# Patient Record
Sex: FEMALE | Race: WHITE | URBAN - METROPOLITAN AREA
[De-identification: names, ages, dates, MRNs, and addresses within clinical notes are randomized per-mention and may not be internally consistent; named-entity substitution may affect disease eponyms.]

---

## 2023-06-27 ENCOUNTER — OUT OF OFFICE VISIT (OUTPATIENT)
Dept: URBAN - METROPOLITAN AREA MEDICAL CENTER 28 | Facility: MEDICAL CENTER | Age: 38
End: 2023-06-27
Payer: COMMERCIAL

## 2023-06-27 DIAGNOSIS — K70.31 ALCOHOLIC CIRRHOSIS OF LIVER WITH ASCITES: ICD-10-CM

## 2023-06-27 DIAGNOSIS — R79.89 ABNORMAL BILIRUBIN TEST: ICD-10-CM

## 2023-06-27 DIAGNOSIS — R74.8 ABNORMAL ALKALINE PHOSPHATASE TEST: ICD-10-CM

## 2023-06-27 DIAGNOSIS — K76.6 CLINICALLY SIGNIFICANT PORTAL HYPERTENSION: ICD-10-CM

## 2023-06-27 PROCEDURE — G8427 DOCREV CUR MEDS BY ELIG CLIN: HCPCS | Performed by: INTERNAL MEDICINE

## 2023-06-27 PROCEDURE — 99222 1ST HOSP IP/OBS MODERATE 55: CPT | Performed by: INTERNAL MEDICINE

## 2023-06-27 PROCEDURE — 99232 SBSQ HOSP IP/OBS MODERATE 35: CPT | Performed by: INTERNAL MEDICINE

## 2023-06-28 ENCOUNTER — OUT OF OFFICE VISIT (OUTPATIENT)
Dept: URBAN - METROPOLITAN AREA MEDICAL CENTER 28 | Facility: MEDICAL CENTER | Age: 38
End: 2023-06-28
Payer: COMMERCIAL

## 2023-06-28 DIAGNOSIS — K74.69 CIRRHOSIS, CRYPTOGENIC: ICD-10-CM

## 2023-06-28 PROCEDURE — 43235 EGD DIAGNOSTIC BRUSH WASH: CPT | Performed by: INTERNAL MEDICINE

## 2023-07-14 ENCOUNTER — LAB OUTSIDE AN ENCOUNTER (OUTPATIENT)
Dept: URBAN - METROPOLITAN AREA CLINIC 19 | Facility: CLINIC | Age: 38
End: 2023-07-14

## 2023-07-14 ENCOUNTER — WEB ENCOUNTER (OUTPATIENT)
Dept: URBAN - METROPOLITAN AREA CLINIC 19 | Facility: CLINIC | Age: 38
End: 2023-07-14

## 2023-07-14 ENCOUNTER — OFFICE VISIT (OUTPATIENT)
Dept: URBAN - METROPOLITAN AREA CLINIC 19 | Facility: CLINIC | Age: 38
End: 2023-07-14
Payer: COMMERCIAL

## 2023-07-14 VITALS
BODY MASS INDEX: 34.45 KG/M2 | WEIGHT: 194.4 LBS | DIASTOLIC BLOOD PRESSURE: 78 MMHG | TEMPERATURE: 98 F | SYSTOLIC BLOOD PRESSURE: 132 MMHG | HEIGHT: 63 IN | HEART RATE: 89 BPM

## 2023-07-14 DIAGNOSIS — K70.31 ALCOHOLIC CIRRHOSIS OF LIVER WITH ASCITES: ICD-10-CM

## 2023-07-14 PROBLEM — 420054005: Status: ACTIVE | Noted: 2023-07-14

## 2023-07-14 PROCEDURE — 99215 OFFICE O/P EST HI 40 MIN: CPT | Performed by: NURSE PRACTITIONER

## 2023-07-14 RX ORDER — FOLIC ACID 1 MG/1
1 TABLET TABLET ORAL ONCE A DAY
Status: ON HOLD | COMMUNITY

## 2023-07-14 RX ORDER — SPIRONOLACTONE 50 MG/1
1 TABLET TABLET, FILM COATED ORAL ONCE A DAY
Status: ACTIVE | COMMUNITY

## 2023-07-14 RX ORDER — CITALOPRAM 20 MG/1
1 TABLET TABLET, FILM COATED ORAL ONCE A DAY
Status: ACTIVE | COMMUNITY

## 2023-07-14 RX ORDER — FUROSEMIDE 20 MG/1
1 TABLET TABLET ORAL ONCE A DAY
Status: ACTIVE | COMMUNITY

## 2023-07-14 RX ORDER — LACTULOSE 10 G/15ML
15 ML AS NEEDED SOLUTION ORAL ONCE A DAY
Status: ACTIVE | COMMUNITY

## 2023-07-14 NOTE — HPI-ZZZTODAY'S VISIT
38-year-old female presents to the office for urgent office visit/hospital follow-up.  She was admitted to the hospital 6/27/2023 for worsening abdominal distention and lower extremity edema.  She has a history of EtOH abuse.  Father passed away in March and has been drinking heavily.  Admission labs hemoglobin 12.2, platelets 102, INR 1.6, bilirubin 4.3, alk phos 203, AST 69, ALT 20, lipase 97, ethanol level 222.  Hepatitis panel negative MELD score 17 CT abdomen pelvis shows cirrhotic nodular liver as well as portal hypertension, diffuse abdominal and pelvic ascites. EGD 6/28/2023 normal Paracentesis 6/27   11 L of fluid removed, paracentesis 6/29   7.5 L removed Started on Lasix 20 mg and Aldactone 50 mg, lactulose 10 G/15 mL for constipation  She has abdominal swelling, jaundice and nausea. No pedal edema.  Last drink was June 25th. She is not following any special diet. She has lost 11lbs since leaving hospital.  PLAN labs, paracentesis prn with albumin replacement, low NA diet, consider referral to Liver tranplant team, adjust meds up if labs are ok

## 2023-07-18 ENCOUNTER — LAB OUTSIDE AN ENCOUNTER (OUTPATIENT)
Dept: URBAN - METROPOLITAN AREA CLINIC 19 | Facility: CLINIC | Age: 38
End: 2023-07-18

## 2023-07-18 ENCOUNTER — TELEPHONE ENCOUNTER (OUTPATIENT)
Dept: URBAN - METROPOLITAN AREA CLINIC 19 | Facility: CLINIC | Age: 38
End: 2023-07-18

## 2023-07-18 LAB
A/G RATIO: 0.9
ABSOLUTE BASOPHILS: 29
ABSOLUTE EOSINOPHILS: 130
ABSOLUTE LYMPHOCYTES: 1205
ABSOLUTE MONOCYTES: 218
ABSOLUTE NEUTROPHILS: 2617
AFP, SERUM, TUMOR MARKER: 4.6
ALBUMIN: 3.4
ALKALINE PHOSPHATASE: 158
ALT (SGPT): 14
AST (SGOT): 36
BASOPHILS: 0.7
BILIRUBIN, TOTAL: 7.3
BUN/CREATININE RATIO: (no result)
BUN: 12
CALCIUM: 8.8
CARBON DIOXIDE, TOTAL: 21
CHLORIDE: 103
COMMENT(S): (no result)
CREATININE: 0.5
EGFR: 122
EOSINOPHILS: 3.1
GLOBULIN, TOTAL: 3.9
GLUCOSE: 115
HEMATOCRIT: 38
HEMOGLOBIN: 13.3
INR: 1.4
LYMPHOCYTES: 28.7
MCH: 35.8
MCHC: 35
MCV: 102.4
MONOCYTES: 5.2
MPV: 11.9
NEUTROPHILS: 62.3
PLATELET COUNT: 94
POTASSIUM: 4.7
PROTEIN, TOTAL: 7.3
PT: 14.4
RDW: 12.1
RED BLOOD CELL COUNT: 3.71
SODIUM: 136
WHITE BLOOD CELL COUNT: 4.2

## 2023-07-18 RX ORDER — FUROSEMIDE 40 MG/1
1 TABLET TABLET ORAL ONCE A DAY
Qty: 30 | Refills: 1 | OUTPATIENT
Start: 2023-07-18

## 2023-07-18 RX ORDER — SPIRONOLACTONE 100 MG/1
1 TABLET TABLET, FILM COATED ORAL ONCE A DAY
Qty: 30 | Refills: 1 | OUTPATIENT
Start: 2023-07-18 | End: 2023-09-16

## 2023-07-18 RX ORDER — FOLIC ACID 1 MG/1
1 TABLET TABLET ORAL ONCE A DAY
Status: ON HOLD | COMMUNITY

## 2023-07-18 RX ORDER — LACTULOSE 10 G/15ML
15 ML AS NEEDED SOLUTION ORAL ONCE A DAY
Status: ACTIVE | COMMUNITY

## 2023-07-18 RX ORDER — FUROSEMIDE 20 MG/1
1 TABLET TABLET ORAL ONCE A DAY
Status: ACTIVE | COMMUNITY

## 2023-07-18 RX ORDER — CITALOPRAM 20 MG/1
1 TABLET TABLET, FILM COATED ORAL ONCE A DAY
Status: ACTIVE | COMMUNITY

## 2023-07-18 RX ORDER — SPIRONOLACTONE 50 MG/1
1 TABLET TABLET, FILM COATED ORAL ONCE A DAY
Status: ACTIVE | COMMUNITY

## 2023-07-24 ENCOUNTER — TELEPHONE ENCOUNTER (OUTPATIENT)
Dept: URBAN - METROPOLITAN AREA CLINIC 86 | Facility: CLINIC | Age: 38
End: 2023-07-24

## 2023-07-24 PROBLEM — 443913008: Status: ACTIVE | Noted: 2023-07-24

## 2023-07-24 NOTE — HPI-TODAY'S VISIT:
Patient is a 38-year-old female coming in to see us at the request of Barb Espinosa nurse practitioner and Dr. Antoine Gomez for evaluation of recently noted cirrhosis suspected due to alcohol.  A copy of the note will be sent to the referring provider.  Patient was last seen by them on July 14, 2023 for a posthospital visit. She was apparently admitted on June 27, 2023 to a local facility for worsening abdominal distention and edema. She had a history of alcohol usage. Her father apparently passed away in March and she had been drinking heavily until then. Admission labs reported a hemoglobin of 12.2 platelet count 102 INR 1.6 bilirubin 4.3 alk phos 203 AST 69 ALT 20 lipase 97 ethanol level 222.  Hepatitis ABC panel was reported negative. Patient had a MELD score reported to be 17 but do not have a DF score. CT of the abdomen showed cirrhotic nodular liver as well as portal pretension and diffuse abdominal pelvic ascites. June 28, 2023 EGD was reported normal. June 27,011 and 11 L paracentesis was done on June 29 with 7.5 L paracentesis was removed. Patient was started on Lasix 20 and Aldactone 50 mg a day and given lactulose mostly for constipation per their note for encephalopathy issues. Last alcohol was on June 25. When seen on July 14 she was not on any special diet but had lost about 11 pounds since leaving the hospital. The plan was to give her paracentesis as needed with albumin, watch her labs and reassess. Weight was noted to be 194.4 pounds at the time with a BMI of 34.43. Abdomen is reported to be distended then. Local labs from July 14 showed AFP of 4.6.  With a sugar of 115 BUN of 12 creatinine 0.5 sodium 136 potassium 4.7 chloride 103 CO2 of 21 calcium 8.8 albumin diminished at 3.4 bilirubin elevated 7.3 alkaline phosphatase 158 AST of 36 and ALT of 14.  WBC 4.2 hemoglobin 13.3 plate count 94 .4 with neutrophils 2617 and lymphocytes 1205.  Prothrombin time was listed as 14.4 with normal up to 11.5 seconds there.  INR was 1.4. Df 20.6 Meld 18/19.  Able to review some of the [facility] records directly and saw that on June 28, 2023 Dr. Perkins did the EGD and it was normal with the Z-line at 37 cm and the stomach and duodenum also appeared normal. The June 27, 2023 admit consult mentions how patient had been complaining about edema for a week and had been using alcohol at least a bottle of wine per day for several years and then had cut back to 3 to 4 glasses a day but then when her father passed in March she was drinking more heavily and came in with the hemoglobin of 12.2 INR 1.6 platelets 102 bilirubin 4.3 alk phos 283 AST 69 ALT 20 lipase 97 and ethanol of 222. Mention how the CT again has shown the diffuse abdominal pelvic ascites as well as sequela of portal pretension and the cirrhosis. She never had a colonoscopy.  Previous EGD had been 20 years ago. Other past medical history listed ADHD, alcohol use, anxiety, diabetes, ascites.  Jaundice. Alcohol usage again was as listed above.  Denied tobacco usage.  CT scan had shown lower thorax with no effusion of the pleural or pericardial area but did a cirrhotic nodular liver with sequela of hypertension including gastroesophageal and splenic varices and moderate to large intra-abdominal ascites.  The fluid appeared simple.  Gallbladder was unremarkable.  No bile duct dilation seen.  Spleen was enlarged.  Splenic mass.  Perisplenic ascites were seen.  No significant bowel wall thickening or evidence of free air was seen.  Atherosclerotic calcifications were seen in the abdominal aorta and branches but without aneurysm.  They felt her MELD score was 17 at the time and they plan for the EGD to be done. July 18, 2023 ultrasound was 7.1 L. June 29, 2020 ultrasound 7.5 L. June 27, 2000 2311 L. Discharge summary mentions she was admitted from the 27th of the 29th and that she was scheduled to follow-up with Dr. Cruz. They had recommended her to be on thiamine and folic acid.  She was kept on her Lasix and Aldactone after her paracentesis treatments.  She was monitored for withdrawal and they were not noted.  She was kept on her citalopram and Adderall for her anxiety/ADHD/depression. She was reported to have type 2 diabetes but with an A1c of 4.4 and been off of medicines for 2 years. Discharge medicines include citalopram 1 and half tablets at night, folic acid 100-day, Lasix 20 mils a day, lactulose 15 mL once a day as needed, Aldactone 50 mg a day and thiamine 100 mg a day. 1.	Alcoholic hepatitis with ascites with the patient having heavy alcohol through the admission in June 2023 with a blood alcohol level of 222.  Patient noted to have a MELD less than 21 and DF score less than 32.  Patient was treated with paracentesis x3, started on diuretics and apparently doing better.  He is to be on low-salt diet.  Was reported to have lost 11 pounds as of July 14 visit.  Now coming in to see us here for opinion.  Stressed importance of continued alcohol abstinence as the best chance for long-term improvement as well as also for transplant as needed and to be able to be considered for same as well.  Recommended counseling. 2.	Alcoholic cirrhosis underlying with varices seen on CT but not by EGD.  Would like MRI next time to get another look at his MRI sometimes can see that better as well and we will plan for that to be done possibly in the September timeframe once the ascites is doing better as needed ascites to be better to get that looked.  MELD score again less than 21 and DF score less than 32.  Patient needs to be on thiamine and folic acid and have intermittent tumor screening as well as occasional variceal screening given this history.  Varices again seen by CT but not by EGD. 3.	Alcohol use disorder in remission.  Recently and has been apparently consuming heavier before had been on a much lower amount and then increased again with death of family member.  Needs counseling options to be explored.  Explained that absolute abstinence has been associated with the best outcomes. 4.	High risk medication usage noted on diuretics and follow on same. 5.	Diabetes history noted but not on medications. 6.	ADHD history noted on medications. 7.	Anxiety and depression history noted and follows as per team. 8.	Irritable bowel syndrome reported history. 9.	Vaccine status needs check for hep A/B to confirm immunity to same. Plan 1.  _update labs to see how patient continues to do. 2.  Encourage patient remain off of alcohol and to seek out counseling for best chance for long-term remission and also in case she does not improve further to be a candidate for transplant if needed. 3.  Consider advanced MRI for next exam to look at liver further and to avoid the CT contrast and get a better look at the liver. 4.  Continue her thiamine and folic acid. 5.  Stressed that absolute abstinence is the best chance for long-term to be better.  Duration of the visit was minutes with 10 minutes of chart prep and 20 minutes of face to face/TeleMed visit reviewing recent records, discussing their current status and the future plans for the patient.

## 2023-07-31 ENCOUNTER — OFFICE VISIT (OUTPATIENT)
Dept: URBAN - METROPOLITAN AREA CLINIC 86 | Facility: CLINIC | Age: 38
End: 2023-07-31
Payer: COMMERCIAL

## 2023-07-31 VITALS
SYSTOLIC BLOOD PRESSURE: 110 MMHG | HEART RATE: 84 BPM | WEIGHT: 175 LBS | DIASTOLIC BLOOD PRESSURE: 71 MMHG | HEIGHT: 63 IN | TEMPERATURE: 97.1 F | BODY MASS INDEX: 31.01 KG/M2

## 2023-07-31 DIAGNOSIS — Z79.899 HIGH RISK MEDICATION USE: ICD-10-CM

## 2023-07-31 DIAGNOSIS — K70.31 ALCOHOLIC CIRRHOSIS OF LIVER WITH ASCITES: ICD-10-CM

## 2023-07-31 DIAGNOSIS — E11.9 DIABETES: ICD-10-CM

## 2023-07-31 DIAGNOSIS — K58.8 OTHER IRRITABLE BOWEL SYNDROME: ICD-10-CM

## 2023-07-31 PROCEDURE — 99245 OFF/OP CONSLTJ NEW/EST HI 55: CPT

## 2023-07-31 PROCEDURE — 99215 OFFICE O/P EST HI 40 MIN: CPT

## 2023-07-31 RX ORDER — FUROSEMIDE 40 MG/1
1 TABLET TABLET ORAL ONCE A DAY
Qty: 30 | Refills: 1 | Status: ACTIVE | COMMUNITY
Start: 2023-07-18

## 2023-07-31 RX ORDER — SPIRONOLACTONE 50 MG/1
1 TABLET TABLET, FILM COATED ORAL ONCE A DAY
Status: DISCONTINUED | COMMUNITY

## 2023-07-31 RX ORDER — LACTULOSE 10 G/15ML
15 ML AS NEEDED SOLUTION ORAL ONCE A DAY
Qty: 450 ML | Refills: 1

## 2023-07-31 RX ORDER — CITALOPRAM 20 MG/1
1 TABLET TABLET, FILM COATED ORAL ONCE A DAY
Status: ACTIVE | COMMUNITY

## 2023-07-31 RX ORDER — LACTULOSE 10 G/15ML
15 ML AS NEEDED SOLUTION ORAL ONCE A DAY
Status: ACTIVE | COMMUNITY

## 2023-07-31 RX ORDER — SPIRONOLACTONE 100 MG/1
1 TABLET TABLET, FILM COATED ORAL ONCE A DAY
Qty: 30 | Refills: 1 | Status: ACTIVE | COMMUNITY
Start: 2023-07-18 | End: 2023-09-16

## 2023-07-31 RX ORDER — THIAMINE HCL 100 MG
1 TABLET TABLET ORAL ONCE A DAY
Status: ACTIVE | COMMUNITY

## 2023-07-31 RX ORDER — FUROSEMIDE 20 MG/1
1 TABLET TABLET ORAL ONCE A DAY
Status: DISCONTINUED | COMMUNITY

## 2023-07-31 RX ORDER — FOLIC ACID 1 MG/1
1 TABLET TABLET ORAL ONCE A DAY
Status: ACTIVE | COMMUNITY

## 2023-07-31 NOTE — HPI-TODAY'S VISIT:
Patient is a 38-year-old female coming in to see us at the request of Barb Espinosa nurse practitioner and Dr. Antoine Gomez for evaluation of recently noted cirrhosis suspected due to alcohol.    A copy of the note will be sent to the referring provider.  Patient was last seen by them on July 14, 2023 for a posthospital visit.  She was apparently admitted on June 27, 2023 to a local facility for worsening abdominal distention and edema.  She had a history of alcohol usage. Pt at the time was having several glasses at night for years.   Dr Wills is for women having more 2 drinks a day will lead to cirrhosis over time.   Her father apparently passed away in March and she had been drinking heavily until then.  Alcohol stopped since June 25/26 and has not done any counseling.  Admission labs reported a hemoglobin of 12.2 platelet count 102 INR 1.6 bilirubin 4.3 alk phos 203 AST 69 ALT 20 lipase 97 ethanol level 222.  Hepatitis ABC panel was reported negative.  Patient had a MELD score reported to be 17 but do not have a DF score. Severe is  alc hep meld over 21.  But above the 15 cut off for oltx.   CT of the abdomen showed cirrhotic nodular liver as well as portal pretension and diffuse abdominal pelvic ascites.  June 28, 2023 EGD was reported normal.  June 27,011 and 11 L paracentesis was done on June 29 with 7.5 L paracentesis was removed.   July 18 7.5 L and 4th one this friday.  She is learing to be on low salt diet.  She had a tap tomorrow will be 2 weeks post the last one.  Patient was started on Lasix 20 and Aldactone 50 mg a day and was inc since to 40 and 100mg   Weight is down 75 from admit and part fluid and part not.  Still losing some.  The ascites may be better with the dose inc.  She was given lactulose mostly for constipation per their note and not for encephalopathy issues.  When seen on July 14 she was not on any special diet but had lost about 11 pounds more amd was 251 admit since leaving the hospital.  Weight was noted to be 194.4 pounds at the time with a BMI of 34.43.  Abdomen is reported to be distended then.  Local labs from July 14 showed AFP of 4.6.  With a sugar of 115 BUN of 12 creatinine 0.5 sodium 136 potassium 4.7 chloride 103 CO2 of 21 calcium 8.8 albumin diminished at 3.4 bilirubin elevated 7.3 alkaline phosphatase 158 AST of 36 and ALT of 14.  WBC 4.2 hemoglobin 13.3 plate count 94 .4 with neutrophils 2617 and lymphocytes 1205.  Prothrombin time was listed as 14.4 with normal up to 11.5 seconds there.  INR was 1.4.  Df 20.6  Meld 18/19.  Able to review some of the [facility] records directly and saw that on June 28, 2023 Dr. Perkins did the EGD and it was normal with the Z-line at 37 cm and the stomach and duodenum also appeared normal.  The June 27, 2023 admit consult mentions how patient had been complaining about edema for a week and had been using alcohol at least a bottle of wine per day for several years and then had cut back to 3 to 4 glasses a day but then when her father passed in March she was drinking more heavily and came in with the hemoglobin of 12.2 INR 1.6 platelets 102 bilirubin 4.3 alk phos 283 AST 69 ALT 20 lipase 97 and ethanol of 222.  Mention how the CT again has shown the diffuse abdominal pelvic ascites as well as sequela of portal pretension and the cirrhosis.  She never had a colonoscopy.  Previous EGD had been 20 years ago.  Other past medical history listed ADHD, alcohol use, anxiety, diabetes, ascites.  Jaundice.  A1c 4.4 % without meds.  Alcohol usage again was as listed above.  Denied tobacco usage.    CT scan had shown lower thorax with no effusion of the pleural or pericardial area but did a cirrhotic nodular liver with sequela of hypertension including gastroesophageal and splenic varices and moderate to large intra-abdominal ascites.  The fluid appeared simple.  Gallbladder was unremarkable.  No bile duct dilation seen.  Spleen was enlarged.  Splenic mass.  Perisplenic ascites were seen.  No significant bowel wall thickening or evidence of free air was seen.  Atherosclerotic calcifications were seen in the abdominal aorta and branches but without aneurysm.  They felt her MELD score was 17 at the time and they plan for the EGD to be done. July 18, 2023 ultrasound was 7.1 L. June 29, 2020 ultrasound 7.5 L. June 27, 2000 2311 L.  Discharge summary mentions she was admitted from the 27th of the 29th and that she was scheduled to follow-up with Dr. Cruz.  She was a 3m wait so was set up to see u.s.  They had recommended her to be on thiamine and folic acid.    She was kept on her Lasix and Aldactone after her paracentesis treatments.  She was monitored for withdrawal and they were not noted.  She was kept on her citalopram and Adderall for her anxiety/ADHD/depression.  She was reported to have type 2 diabetes but with an A1c of 4.4 and been off of medicines for 2 years.  Discharge medicines include citalopram 1 and half tablets at night, folic acid 100-day, Lasix 20 mgsa day, lactulose 15 mL once a day as needed, Aldactone 50 mg a day and thiamine 100 mg a day.   Plan 1.  Need to update labs to see how patient continues to do and to see if meld and df are better, 2.  Encourage patient remain off of alcohol and to seek out counseling for best chance for long-term remission and also in case she does not improve further to be a candidate for transplant if needed. 3.  Consider advanced MRI for next exam to look at liver further and to avoid the CT contrast and get a better look at the liver. 4.  Continue her thiamine and folic acid. 5.  Stressed that absolute abstinence is the best chance for long-term to be better.  Duration of the visit was 80 minutes with 40 minutes of chart prep loadingionfo tochart for visit and 40 minutes of face to face visit reviewing recent records, discussing their current status and the future plans for the patient.

## 2023-07-31 NOTE — EXAM-PHYSICAL EXAM
Gen: awake and responsive. Eyes: min icteric, normal lids. Mouth: normal lips. Nose: no drainage. Hearing: intact grossly. Neck: trachea midline and no jvd. CV: RRR no s3. Lungs: clear. No wheezes, Abd: Soft, nabs, nr, at the most some mild ascites , NT. No appreciable liver or spleen enlargement. Small umb hernia and reduces and not tender and ventral hernia from muscle loss and recent ascites.  Ext: no sig edema, some palm erythema. Neuro: moves all 4 ext grossly. No asterixis. Skin: no pruritis and some palm erythema.

## 2023-08-04 ENCOUNTER — TELEPHONE ENCOUNTER (OUTPATIENT)
Dept: URBAN - METROPOLITAN AREA CLINIC 19 | Facility: CLINIC | Age: 38
End: 2023-08-04

## 2023-08-04 ENCOUNTER — LAB OUTSIDE AN ENCOUNTER (OUTPATIENT)
Dept: URBAN - METROPOLITAN AREA CLINIC 19 | Facility: CLINIC | Age: 38
End: 2023-08-04

## 2023-08-04 LAB
ABSOLUTE BASOPHIL COUNT: 0.03
ABSOLUTE EOSINOPHIL COUNT: 0.06
ABSOLUTE IMMATURE GRANULOCYTE  COUNT: 0.01
ABSOLUTE LYMPHOCYTE COUNT: 1.09
ABSOLUTE MONOCYTE COUNT: 0.25
ABSOLUTE NEUTROPHIL COUNT (ANC): 2.74
ABSOLUTE NRBC  COUNT: 0
BASOPHIL AUTO: 1
EOS AUTO: 1
HCT: 35.6
HGB: 12.4
IMMATURE GRANULOCYTES AUTO: 0.2
INR: 1.5
IPF: 4.9
LYMPH AUTO: 26
MCH: 36.6
MCHC: 34.8
MCV: 105
MONO AUTO: 6
MPV: 11.4
NEUTRO AUTO: 66
NRBC AUTO: 0
PARTIAL THROMBOPLASTIN TIME: 36.6
PERFORMING LAB: (no result)
PLATELETS: 67
PLT ESTIMATION: (no result)
PLT LARGE FORMS: PRESENT
PT: 17.4
RBC MORPH: (no result)
RBC: 3.39
RDW: 12.9
SLIDE REVIEW: (no result)
WBC: 4.2

## 2023-08-10 ENCOUNTER — TELEPHONE ENCOUNTER (OUTPATIENT)
Dept: URBAN - METROPOLITAN AREA CLINIC 86 | Facility: CLINIC | Age: 38
End: 2023-08-10

## 2023-08-10 LAB
A/G RATIO: 1.1
ABSOLUTE BASOPHILS: 40
ABSOLUTE EOSINOPHILS: 101
ABSOLUTE LYMPHOCYTES: 1250
ABSOLUTE MONOCYTES: 290
ABSOLUTE NEUTROPHILS: 2719
ALBUMIN: 3.7
ALKALINE PHOSPHATASE: 154
ALPHA-1-ANTITRYPSIN (AAT) PHENOTYPE: (no result)
ALT (SGPT): 18
AST (SGOT): 39
BASOPHILS: 0.9
BILIRUBIN, TOTAL: 6.2
BUN/CREATININE RATIO: (no result)
BUN: 16
CALCIUM: 8.9
CARBON DIOXIDE, TOTAL: 21
CERULOPLASMIN: 32
CHLORIDE: 101
CREATININE: 0.65
EGFR: 114
EOSINOPHILS: 2.3
FERRITIN, SERUM: 440
GLOBULIN, TOTAL: 3.5
GLUCOSE: 132
HEMATOCRIT: 36.2
HEMOGLOBIN: 13.1
HEPATITIS A AB, TOTAL: (no result)
HEPATITIS B SURFACE AB IMMUNITY, QN: 105
INR: 1.3
IRON BIND.CAP.(TIBC): 181
IRON SATURATION: 55
IRON: 100
LYMPHOCYTES: 28.4
MCH: 35.6
MCHC: 36.2
MCV: 98.4
MONOCYTES: 6.6
MPV: 11.1
NEUTROPHILS: 61.8
PLATELET COUNT: 83
POTASSIUM: 4.6
PROTEIN, TOTAL: 7.2
PT: 13.4
RDW: 12.3
RED BLOOD CELL COUNT: 3.68
SODIUM: 135
WHITE BLOOD CELL COUNT: 4.4

## 2023-08-10 NOTE — HPI-TODAY'S VISIT:
Dear Lupe Song, July 31st labs showed your iron sat was elevated at 55%.  We may need to do a hemochromatosis panel to follow-up on that to see why it is running high.  It could still also be high as well because of the alcohol use previously.  Ceruloplasmin was normal at 32. Ferritin was also again up in keeping with iron being up at 440. Hep B immunity was seen at a level of 105. Your alpha-1 screen was normal at MM type. Sugar was up at 132 and higher from July 15 when it was 115. BUN is 16 creatinine 0.65 sodium 135 potassium 4.6 calcium 8.9 albumin 3.7 normal, bilirubin was 6.2 down from 7.3 (not fractionated and need that done next time) and alk phos was 154 from 158 and AST 39 from 36 and ALT of 18 from 14. WBC was 4.2, hemoglobin 12.4 and platelet count low at 67.  MCV was 105. INR was elevated at 1.5 with a prothrombin time 17.4. No hep A immunity was seen.  You should consider getting the hepatitis A vaccine series electively. Summary: DF 17.2 so less than 32. Meld 18 and meld na 19 so less than 21 which is good to see but would want it to be less than 15 and above that. Also, as you recall, you had previously in June when back in the hospital had a MELD score that had been less than 21 and a DF score of less than 32.  You had lost a substantial amount of weight also between your illness and your diuretics. Despite the high sugar your A1c was reported not to be up. Need to follow trends as more time passes.  Treatment is as we discussed supportive and to watch for any issues with your diuretics or labs over time. Continued abstinence is bradshaw. Dr Gee

## 2023-08-23 ENCOUNTER — TELEPHONE ENCOUNTER (OUTPATIENT)
Dept: URBAN - METROPOLITAN AREA CLINIC 86 | Facility: CLINIC | Age: 38
End: 2023-08-23

## 2023-08-31 ENCOUNTER — TELEPHONE ENCOUNTER (OUTPATIENT)
Dept: URBAN - METROPOLITAN AREA CLINIC 86 | Facility: CLINIC | Age: 38
End: 2023-08-31

## 2023-09-04 ENCOUNTER — LAB OUTSIDE AN ENCOUNTER (OUTPATIENT)
Dept: URBAN - METROPOLITAN AREA CLINIC 86 | Facility: CLINIC | Age: 38
End: 2023-09-04

## 2023-09-04 PROBLEM — 389026000: Status: ACTIVE | Noted: 2023-09-04

## 2023-09-21 ENCOUNTER — TELEPHONE ENCOUNTER (OUTPATIENT)
Dept: URBAN - METROPOLITAN AREA CLINIC 86 | Facility: CLINIC | Age: 38
End: 2023-09-21

## 2023-09-21 LAB
A/G RATIO: 1.2
ABSOLUTE BASOPHILS: 20
ABSOLUTE EOSINOPHILS: 101
ABSOLUTE LYMPHOCYTES: 1045
ABSOLUTE MONOCYTES: 191
ABSOLUTE NEUTROPHILS: 2543
ALBUMIN: 3.4
ALKALINE PHOSPHATASE: 113
ALT (SGPT): 16
AST (SGOT): 35
BASOPHILS: 0.5
BILIRUBIN, TOTAL: 9.4
BUN/CREATININE RATIO: (no result)
BUN: 15
CALCIUM: 8.5
CARBON DIOXIDE, TOTAL: 23
CHLORIDE: 101
COMMENT(S): (no result)
CREATININE: 0.67
EGFR: 113
EOSINOPHILS: 2.6
GLOBULIN, TOTAL: 2.9
GLUCOSE: 104
HEMATOCRIT: 35.3
HEMOGLOBIN: 12.7
INR: 1.4
LYMPHOCYTES: 26.8
MCH: 36.6
MCHC: 36
MCV: 101.7
MONOCYTES: 4.9
MPV: 10.4
NEUTROPHILS: 65.2
PLATELET COUNT: 81
POTASSIUM: 4.5
PROTEIN, TOTAL: 6.3
PT: 14.5
RDW: 13.6
RED BLOOD CELL COUNT: 3.47
SODIUM: 135
WHITE BLOOD CELL COUNT: 3.9

## 2023-09-21 NOTE — HPI-TODAY'S VISIT:
Dear Lupe Song, Sept 20 labs show inr little up 1.4 but down from 1.5. Glucose 104 elevated but down from 132. Bun 15 and cr 0.67 and na 135 and k 4.5 and cl 101 and co2 23 and calcium 8.5 and little low and may want to share with primary and look at this and your vitamin d level status. Albumin little lower 3.4 from 3.7 prior. Total bili 9.4 and up from 6.2 and prior 7.3/. Alk 113 and asr 35 and alt 16. Prior ast 9 and alt 18. Wbc 3.9 and hg 12.7 and mcv 101.7 elevated. Platelets low 81.  Rbc little low 3.47 and mch little up 36.6. Neutrophils 2543 and lymphs 1045 normal. Monocytes little low 191. Meld 19 and meld na 20. This went up from 17 and concerning to see.  You have appt coming up 9-27 and we need to discuss this and why labs may be worsening. Need to review options and plans given changes. Dr Gee

## 2023-09-27 ENCOUNTER — OFFICE VISIT (OUTPATIENT)
Dept: URBAN - METROPOLITAN AREA CLINIC 86 | Facility: CLINIC | Age: 38
End: 2023-09-27
Payer: COMMERCIAL

## 2023-09-27 ENCOUNTER — LAB OUTSIDE AN ENCOUNTER (OUTPATIENT)
Dept: URBAN - METROPOLITAN AREA CLINIC 86 | Facility: CLINIC | Age: 38
End: 2023-09-27

## 2023-09-27 ENCOUNTER — TELEPHONE ENCOUNTER (OUTPATIENT)
Dept: URBAN - METROPOLITAN AREA CLINIC 86 | Facility: CLINIC | Age: 38
End: 2023-09-27

## 2023-09-27 VITALS
WEIGHT: 176 LBS | DIASTOLIC BLOOD PRESSURE: 69 MMHG | HEIGHT: 63 IN | BODY MASS INDEX: 31.18 KG/M2 | HEART RATE: 101 BPM | SYSTOLIC BLOOD PRESSURE: 108 MMHG | TEMPERATURE: 97 F

## 2023-09-27 DIAGNOSIS — F10.91 ALCOHOL USE DISORDER IN REMISSION: ICD-10-CM

## 2023-09-27 DIAGNOSIS — K70.31 ALCOHOLIC CIRRHOSIS OF LIVER WITH ASCITES: ICD-10-CM

## 2023-09-27 DIAGNOSIS — K70.11 ALCOHOLIC HEPATITIS WITH ASCITES: ICD-10-CM

## 2023-09-27 DIAGNOSIS — K76.82 HEPATIC ENCEPHALOPATHY: ICD-10-CM

## 2023-09-27 PROCEDURE — 99215 OFFICE O/P EST HI 40 MIN: CPT

## 2023-09-27 RX ORDER — FUROSEMIDE 20 MG/1
1 TABLET TABLET ORAL TWICE A DAY
Qty: 60 TABLET | Refills: 1
Start: 2023-07-18

## 2023-09-27 RX ORDER — FOLIC ACID 1 MG/1
1 TABLET TABLET ORAL ONCE A DAY
Status: ACTIVE | COMMUNITY

## 2023-09-27 RX ORDER — FUROSEMIDE 20 MG/1
1 TABLET TABLET ORAL ONCE A DAY
Qty: 30 | Refills: 1 | Status: ACTIVE | COMMUNITY
Start: 2023-07-18

## 2023-09-27 RX ORDER — LACTULOSE 10 G/15ML
15 ML AS NEEDED SOLUTION ORAL ONCE A DAY
Qty: 450 ML | Refills: 1 | Status: ACTIVE | COMMUNITY

## 2023-09-27 RX ORDER — SPIRONOLACTONE 50 MG/1
1 TABLET TABLET, FILM COATED ORAL ONCE A DAY
Status: ACTIVE | COMMUNITY

## 2023-09-27 RX ORDER — CITALOPRAM 20 MG/1
1 TABLET TABLET, FILM COATED ORAL ONCE A DAY
Status: ACTIVE | COMMUNITY

## 2023-09-27 RX ORDER — AMOXICILLIN AND CLAVULANATE POTASSIUM 500; 125 MG/1; 1/1
1 TABLET TABLET, FILM COATED ORAL
Status: ACTIVE | COMMUNITY

## 2023-09-27 RX ORDER — THIAMINE HCL 100 MG
1 TABLET TABLET ORAL ONCE A DAY
Status: ACTIVE | COMMUNITY

## 2023-09-27 RX ORDER — SPIRONOLACTONE 50 MG/1
1 TABLET TABLET, FILM COATED ORAL TWICE A DAY
Qty: 60 TABLET | Refills: 1

## 2023-09-27 RX ORDER — LACTULOSE 10 G/15ML
15 ML AS NEEDED SOLUTION ORAL ONCE A DAY
Qty: 450 ML | Refills: 1

## 2023-09-27 RX ORDER — RIFAXIMIN 550 MG/1
1 TABLET TABLET ORAL
Qty: 60 | Refills: 3 | OUTPATIENT
Start: 2023-09-27 | End: 2023-10-27

## 2023-09-27 NOTE — EXAM-PHYSICAL EXAM
Gen: awake and responsive. Eyes: more icteric, normal lids. Mouth: normal lips. Nose: no drainage. Hearing: intact grossly. Neck: trachea midline and no jvd. CV: RRR no s3. Lungs: clear. No wheezes, Abd: Soft, nabs, nr, protuberant.mod  ascites , NT.   Small umb hernia.  Ext: some edema, some palm erythema. Neuro: moves all 4 ext grossly.She had overt asterixis. Skin: no pruritis and some palm erythema.

## 2023-09-27 NOTE — HPI-TODAY'S VISIT:
Patient is a 38-year-old female Seen july 2023 at the request of Barb Espinosa nurse practitioner and Dr. Anotine Gomez for evaluation of recently noted cirrhosis suspected due to alcohol.    A copy of the note will be sent to the referring provider.  She had been less jaundiced and she says over the last 5 weeks people commented.  3m off alcohol and send over to the oltx,  Doing therapy with private therapist.  Refer to [place] as 3m and not getting better.  Did ct at [facility] and done for pain post tap and that was new and chills and Dr Tolentino and went in and colitis.  They gave her abx and bentyl.   Sept 20 labs show inr little up 1.4 but down from 1.5. Glucose 104 elevated but down from 132. Bun 15 and cr 0.67 and na 135 and k 4.5 and cl 101 and co2 23 and calcium 8.5 and little low and may want to share with primary and look at this and your vitamin d level status. Albumin little lower 3.4 from 3.7 prior. Total bili 9.4 and up from 6.2 and prior 7.3. Alk 113 and ast 35 and alt 16. Prior ast 9 and alt 18. Wbc 3.9 and hg 12.7 and mcv 101.7 elevated. Platelets low 81.  Rbc little low 3.47 and mch little up 36.6. Neutrophils 2543 and lymphs 1045 normal. Monocytes little low 191. Meld 19 and meld na 20. This went up from 17 and concerning to see.   She did tap friday and got 4.5 liters off and says did tap on side and not drain as much.  re diuretics she was on lasix 20mg and aldactone once day.  Can go up to bid on those and do labs next week and the following,  telemed in 2 weeks and see doing.  July 31st labs showed your iron sat was elevated at 55%.  We may need to do a hemochromatosis panel to follow-up on that to see why it is running high.  It could still also be high as well because of the alcohol use previously.  Ceruloplasmin was normal at 32. Ferritin was also again up in keeping with iron being up at 440. Hep B immunity was seen at a level of 105. Your alpha-1 screen was normal at MM type. Sugar was up at 132 and higher from July 15 when it was 115. BUN is 16 creatinine 0.65 sodium 135 potassium 4.6 calcium 8.9 albumin 3.7 normal, bilirubin was 6.2 down from 7.3 (not fractionated and need that done next time) and alk phos was 154 from 158 and AST 39 from 36 and ALT of 18 from 14. WBC was 4.2, hemoglobin 12.4 and platelet count low at 67.  MCV was 105. INR was elevated at 1.5 with a prothrombin time 17.4. No hep A immunity was seen.  You should consider getting the hepatitis A vaccine series electively. Summary: DF 17.2 so less than 32. Meld 18 and meld na 19 so less than 21 which is good to see but would want it to be less than 15 and above that.  Also, as you recall, you had previously in June when back in the hospital had a MELD score that had been less than 21 and a DF score of less than 32.  You had lost a substantial amount of weight also between your illness and your diuretics. Despite the high sugar your A1c was reported not to be up. Need to follow trends as more time passes.  Treatment is as we discussed supportive and to watch for any issues with your diuretics or labs over time. Continued abstinence is key.  recap:  Patient was last seen by them on July 14, 2023 for a posthospital visit.  She was apparently admitted on June 27, 2023 to a local facility for worsening abdominal distention and edema.  She had a history of alcohol usage. Pt at the time was having several glasses at night for years.   Dr Wills is for women having more 2 drinks a day will lead to cirrhosis over time.   Her father apparently passed away in March and she had been drinking heavily until then.  Alcohol stopped since June 25/26 and has not done any counseling.  Admission labs reported a hemoglobin of 12.2 platelet count 102 INR 1.6 bilirubin 4.3 alk phos 203 AST 69 ALT 20 lipase 97 ethanol level 222.  Hepatitis ABC panel was reported negative.  Patient had a MELD score reported to be 17 but do not have a DF score. Severe is  alc hep meld over 21.  But above the 15 cut off for oltx.   CT of the abdomen showed cirrhotic nodular liver as well as portal pretension and diffuse abdominal pelvic ascites.  June 28, 2023 EGD was reported normal.  June 27,011 and 11 L paracentesis was done on June 29 with 7.5 L paracentesis was removed.   July 18 7.5 L and 4th one this friday.  She is learing to be on low salt diet.  She had a tap tomorrow will be 2 weeks post the last one.  Patient was started on Lasix 20 and Aldactone 50 mg a day and was inc since to 40 and 100mg   Weight is down 75 from admit and part fluid and part not.  Still losing some.  The ascites may be better with the dose inc.  She was given lactulose mostly for constipation per their note and not for encephalopathy issues.  When seen on July 14 she was not on any special diet but had lost about 11 pounds more amd was 251 admit since leaving the hospital.  Weight was noted to be 194.4 pounds at the time with a BMI of 34.43.  Abdomen is reported to be distended then.  Local labs from July 14 showed AFP of 4.6.  With a sugar of 115 BUN of 12 creatinine 0.5 sodium 136 potassium 4.7 chloride 103 CO2 of 21 calcium 8.8 albumin diminished at 3.4 bilirubin elevated 7.3 alkaline phosphatase 158 AST of 36 and ALT of 14.  WBC 4.2 hemoglobin 13.3 plate count 94 .4 with neutrophils 2617 and lymphocytes 1205.  Prothrombin time was listed as 14.4 with normal up to 11.5 seconds there.  INR was 1.4.  Df 20.6  Meld 18/19.  Able to review some of the [facility] records directly and saw that on June 28, 2023 Dr. Perkins did the EGD and it was normal with the Z-line at 37 cm and the stomach and duodenum also appeared normal.  The June 27, 2023 admit consult mentions how patient had been complaining about edema for a week and had been using alcohol at least a bottle of wine per day for several years and then had cut back to 3 to 4 glasses a day but then when her father passed in March she was drinking more heavily and came in with the hemoglobin of 12.2 INR 1.6 platelets 102 bilirubin 4.3 alk phos 283 AST 69 ALT 20 lipase 97 and ethanol of 222.  Mention how the CT again has shown the diffuse abdominal pelvic ascites as well as sequela of portal pretension and the cirrhosis.  She never had a colonoscopy.  Previous EGD had been 20 years ago.  Other past medical history listed ADHD, alcohol use, anxiety, diabetes, ascites.  Jaundice.  A1c 4.4 % without meds.  Alcohol usage again was as listed above.  Denied tobacco usage.    CT scan had shown lower thorax with no effusion of the pleural or pericardial area but did a cirrhotic nodular liver with sequela of hypertension including gastroesophageal and splenic varices and moderate to large intra-abdominal ascites.  The fluid appeared simple.  Gallbladder was unremarkable.  No bile duct dilation seen.  Spleen was enlarged.  Splenic mass.  Perisplenic ascites were seen.  No significant bowel wall thickening or evidence of free air was seen.  Atherosclerotic calcifications were seen in the abdominal aorta and branches but without aneurysm.  They felt her MELD score was 17 at the time and they plan for the EGD to be done. July 18, 2023 ultrasound was 7.1 L. June 29, 2020 ultrasound 7.5 L. June 27, 2000 2311 L.  Discharge summary mentions she was admitted from the 27th of the 29th and that she was scheduled to follow-up with Dr. Cruz.  She was a 3m wait so was set up to see u.s.  They had recommended her to be on thiamine and folic acid.    She was kept on her Lasix and Aldactone after her paracentesis treatments.  She was monitored for withdrawal and they were not noted.  She was kept on her citalopram and Adderall for her anxiety/ADHD/depression.  She was reported to have type 2 diabetes but with an A1c of 4.4 and been off of medicines for 2 years.  Discharge medicines include citalopram 1 and half tablets at night, folic acid 100-day, Lasix 20 mgsa day, lactulose 15 mL once a day as needed, Aldactone 50 mg a day and thiamine 100 mg a day.   Plan 1. Inc diuretics to bid 2. Do weights  to see how doing. Keep chart. [id number]. 3. Need to decide taps there and if so I need help from Dr Gomez.  See her for local coordination. 4. Pt  will do labs in 1 and 2 weeks and telemed and meet joselyn to be aware of that, 5.  C michaelk on ct she did at [facility] this weekend. 6.  Did egd at end of June.  7.  Continue her thiamine and folic acid. 8.  Stressed that absolute abstinence is the best chance for long-term to be better. Doing rehab. 9.  Refer to oltx team malou. 10. Ammonia level today through [place] or the labs here for her to see.  Duration of the visit was 50 minutes with 10 minutes of chart prep loading info to chart for visit and 40 minutes of face to face visit reviewing recent records, discussing their current status and the future plans for the patient.

## 2023-09-28 ENCOUNTER — P2P PATIENT RECORD (OUTPATIENT)
Age: 38
End: 2023-09-28

## 2023-09-28 ENCOUNTER — TELEPHONE ENCOUNTER (OUTPATIENT)
Dept: URBAN - METROPOLITAN AREA CLINIC 86 | Facility: CLINIC | Age: 38
End: 2023-09-28

## 2023-09-28 LAB
A/G RATIO: 1
ABSOLUTE BASOPHILS: 0
ABSOLUTE EOSINOPHILS: 53
ABSOLUTE LYMPHOCYTES: 1643
ABSOLUTE MONOCYTES: 106
ABSOLUTE NEUTROPHILS: 3498
ALBUMIN: 3.3
ALKALINE PHOSPHATASE: 142
ALT (SGPT): 16
AMMONIA (P): 48
AST (SGOT): 37
BASOPHILS: 0
BILIRUBIN, TOTAL: 8.8
BUN/CREATININE RATIO: (no result)
BUN: 14
CALCIUM: 8.5
CARBON DIOXIDE, TOTAL: 20
CHLORIDE: 100
COMMENT(S): (no result)
CREATININE: 0.7
EGFR: 112
EOSINOPHILS: 1
GLOBULIN, TOTAL: 3.4
GLUCOSE: 111
HEMATOCRIT: 36.1
HEMOGLOBIN: 12
INR: 1.3
LYMPHOCYTES: 31
MCH: 33.5
MCHC: 33.2
MCV: 100.8
MONOCYTES: 2
MPV: 10.8
NEUTROPHILS: 66
NOTE: (no result)
PLATELET COUNT: 70
PLATELET ESTIMATION: (no result)
POTASSIUM: 4.3
PROTEIN, TOTAL: 6.7
PT: 13.9
RDW: 13.9
RED BLOOD CELL COUNT: 3.58
SODIUM: 134
WHITE BLOOD CELL COUNT: 5.3

## 2023-09-28 NOTE — HPI-TODAY'S VISIT:
Dear Lupe Song, Sept 27:  Ammonia normal 48 and normal less than 72  umol/L. Would still add the xifaxan as we saw the asterixis clincially and you have the memory lapses and see how you do. Inr 1.3 down from 1.4 from 1.5. Glu 111 elevated from 104. Bun 14 and cr 0.7. Na 134 and k 4.3 and cl 100 and co2 20 and ca 8.5 little low. Albumin 3.3 and little lower and due to the taps and liver disease. Tb 8.8 and down 9.4. Alk 142 and up from 113. Ast 37 and alt 16. Prior ast 35 and alt 16.  Wbc 5.3 hg 12 and mcv 100.8 up but little lower. Platelets 70 and down from 81. Neutrophils 3498 and lymphs 1643 normal but monocytes low 106. Meld 18 and meld na 20. Lets see how you do with the diuretics and lets follow the labs and weight trends closely. Called the pt to discuss labs. No side effects from the xifaxan and not seeing a change. Weight 177.2 and 1 pound up from last week. Nothing yet from [place].  Dr Gee

## 2023-09-28 NOTE — HPI-TODAY'S VISIT:
Dear Lupe Song, September 1 ultrasound sent in and it showed a 5.9 L tap.  It states specifically they did not do any studies on the fluid as apparently they do not have a standing order lab study protocol requested for this.  Again this is one of the limitations of the doing the taps elsewhere. September 22 ultrasound also sent in and 4.7 L was removed and again the fluid was discarded as there was no requested analysis and no standing order protocol to do so.  Here they do this on the fluid automatically. That explains why we never received any other fluid studies from there. It is possible that the team there could speak with them to get them to do the studies with the next tap. Dr. Gee

## 2023-09-29 ENCOUNTER — TELEPHONE ENCOUNTER (OUTPATIENT)
Dept: URBAN - METROPOLITAN AREA CLINIC 86 | Facility: CLINIC | Age: 38
End: 2023-09-29

## 2023-09-29 ENCOUNTER — TELEPHONE ENCOUNTER (OUTPATIENT)
Dept: URBAN - METROPOLITAN AREA CLINIC 19 | Facility: CLINIC | Age: 38
End: 2023-09-29

## 2023-10-04 ENCOUNTER — LAB OUTSIDE AN ENCOUNTER (OUTPATIENT)
Dept: URBAN - METROPOLITAN AREA CLINIC 86 | Facility: CLINIC | Age: 38
End: 2023-10-04

## 2023-10-05 ENCOUNTER — LAB OUTSIDE AN ENCOUNTER (OUTPATIENT)
Dept: URBAN - METROPOLITAN AREA CLINIC 19 | Facility: CLINIC | Age: 38
End: 2023-10-05

## 2023-10-11 ENCOUNTER — LAB OUTSIDE AN ENCOUNTER (OUTPATIENT)
Dept: URBAN - METROPOLITAN AREA CLINIC 86 | Facility: CLINIC | Age: 38
End: 2023-10-11

## 2023-10-11 ENCOUNTER — OFFICE VISIT (OUTPATIENT)
Dept: URBAN - METROPOLITAN AREA CLINIC 86 | Facility: CLINIC | Age: 38
End: 2023-10-11
Payer: COMMERCIAL

## 2023-10-11 ENCOUNTER — TELEPHONE ENCOUNTER (OUTPATIENT)
Dept: URBAN - METROPOLITAN AREA CLINIC 86 | Facility: CLINIC | Age: 38
End: 2023-10-11

## 2023-10-11 ENCOUNTER — OFFICE VISIT (OUTPATIENT)
Dept: URBAN - METROPOLITAN AREA CLINIC 85 | Facility: CLINIC | Age: 38
End: 2023-10-11
Payer: COMMERCIAL

## 2023-10-11 VITALS
HEIGHT: 63 IN | WEIGHT: 166 LBS | BODY MASS INDEX: 29.41 KG/M2 | DIASTOLIC BLOOD PRESSURE: 68 MMHG | SYSTOLIC BLOOD PRESSURE: 112 MMHG | TEMPERATURE: 96.9 F | HEART RATE: 84 BPM

## 2023-10-11 DIAGNOSIS — K76.82 HEPATIC ENCEPHALOPATHY: ICD-10-CM

## 2023-10-11 DIAGNOSIS — K70.31 ALCOHOLIC CIRRHOSIS OF LIVER WITH ASCITES: ICD-10-CM

## 2023-10-11 DIAGNOSIS — Z23 ENCOUNTER FOR IMMUNIZATION: ICD-10-CM

## 2023-10-11 DIAGNOSIS — K70.11 ALCOHOLIC HEPATITIS WITH ASCITES: ICD-10-CM

## 2023-10-11 PROCEDURE — 90632 HEPA VACCINE ADULT IM: CPT

## 2023-10-11 PROCEDURE — 99214 OFFICE O/P EST MOD 30 MIN: CPT | Performed by: PHYSICIAN ASSISTANT

## 2023-10-11 PROCEDURE — 90471 IMMUNIZATION ADMIN: CPT

## 2023-10-11 RX ORDER — CITALOPRAM 20 MG/1
1 TABLET TABLET, FILM COATED ORAL ONCE A DAY
Status: ACTIVE | COMMUNITY

## 2023-10-11 RX ORDER — FOLIC ACID 1 MG/1
1 TABLET TABLET ORAL ONCE A DAY
Status: ACTIVE | COMMUNITY

## 2023-10-11 RX ORDER — FUROSEMIDE 20 MG/1
1 TABLET TABLET ORAL TWICE A DAY
Qty: 60 TABLET | Refills: 1 | Status: ACTIVE | COMMUNITY
Start: 2023-07-18

## 2023-10-11 RX ORDER — AMOXICILLIN AND CLAVULANATE POTASSIUM 500; 125 MG/1; 1/1
1 TABLET TABLET, FILM COATED ORAL
Status: ACTIVE | COMMUNITY

## 2023-10-11 RX ORDER — SPIRONOLACTONE 50 MG/1
1 TABLET TABLET, FILM COATED ORAL TWICE A DAY
Qty: 60 TABLET | Refills: 1 | Status: ACTIVE | COMMUNITY

## 2023-10-11 RX ORDER — SPIRONOLACTONE 50 MG/1
1 TABLET TABLET, FILM COATED ORAL TWICE A DAY
Qty: 60 TABLET | Refills: 1

## 2023-10-11 RX ORDER — RIFAXIMIN 550 MG/1
1 TABLET TABLET ORAL
Qty: 60 | Refills: 3 | Status: ACTIVE | COMMUNITY
Start: 2023-09-27 | End: 2023-10-27

## 2023-10-11 RX ORDER — RIFAXIMIN 550 MG/1
1 TABLET TABLET ORAL
Qty: 60 | Refills: 3 | OUTPATIENT

## 2023-10-11 RX ORDER — FUROSEMIDE 20 MG/1
1 TABLET TABLET ORAL TWICE A DAY
Qty: 60 TABLET | Refills: 1

## 2023-10-11 RX ORDER — SPIRONOLACTONE 25 MG/1
1 TABLET TABLET, FILM COATED ORAL DAILY
Qty: 30 TABLET | Refills: 1 | OUTPATIENT
Start: 2023-10-11 | End: 2023-12-09

## 2023-10-11 RX ORDER — LACTULOSE 10 G/15ML
15 ML AS NEEDED SOLUTION ORAL ONCE A DAY
Qty: 450 ML | Refills: 1

## 2023-10-11 RX ORDER — THIAMINE HCL 100 MG
1 TABLET TABLET ORAL ONCE A DAY
Status: ACTIVE | COMMUNITY

## 2023-10-11 RX ORDER — LACTULOSE 10 G/15ML
15 ML AS NEEDED SOLUTION ORAL ONCE A DAY
Qty: 450 ML | Refills: 1 | Status: ACTIVE | COMMUNITY

## 2023-10-11 RX ORDER — LACTULOSE 10 G/15ML
15 ML AS NEEDED SOLUTION ORAL ONCE A DAY
Qty: 450 ML | Refills: 1
End: 2023-12-10

## 2023-10-11 RX ORDER — LACTULOSE 10 G/15ML
15 ML AS NEEDED SOLUTION ORAL ONCE A DAY
Qty: 450 ML | Refills: 1 | Status: ACTIVE | COMMUNITY
End: 2023-12-10

## 2023-10-11 NOTE — HPI-TODAY'S VISIT:
Patient is a 38-year-old female Seen july 2023 at the request of Barb Espinosa nurse practitioner and Dr. Antoine Gomez for evaluation of recently noted cirrhosis suspected due to alcohol.    A copy of the note will be sent to the referring provider.   10/10/23 guerita Dear Lupe Song, Sept 27: Ammonia normal 48 and normal less than 72 umol/L. Would still add the xifaxan as we saw the asterixis clincially and you have the memory lapses and see how you do. Inr 1.3 down from 1.4 from 1.5. Glu 111 elevated from 104. Bun 14 and cr 0.7. Na 134 and k 4.3 and cl 100 and co2 20 and ca 8.5 little low. Albumin 3.3 and little lower and due to the taps and liver disease. Tb 8.8 and down 9.4. Alk 142 and up from 113. Ast 37 and alt 16. Prior ast 35 and alt 16. Wbc 5.3 hg 12 and mcv 100.8 up but little lower. Platelets 70 and down from 81. Neutrophils 3498 and lymphs 1643 normal but monocytes low 106. Meld 18 and meld na 20. Lets see how you do with the diuretics and lets follow the labs and weight trends closely. Called the pt to discuss labs. No side effects from the xifaxan and not seeing a change. Weight 177.2 and 1 pound up from last week. Nothing yet from [place].  She had paracentesis on 10/3/23 had paracentesis and took off 7.5 L.  She is now 89.  wbc 4.8 red blood cells 3.66 mcv 108.7, platelets 84, 13.4,  inr 1.6,  cr. 0.7, ast 43, alt 19, alp 151, tb 7.2 MELD 19 MELD na 23.   she  is taking the aldactone 50 and lasix 20 mg and we will go up on this  Dr Gee Dear Lupe Song, September 1 ultrasound sent in and it showed a 5.9 L tap. It states specifically they did not do any studies on the fluid as apparently they do not have a standing order lab study protocol requested for this. Again this is one of the limitations of the doing the taps elsewhere. September 22 ultrasound also sent in and 4.7 L was removed and again the fluid was discarded as there was no requested analysis and no standing order protocol to do so. Here they do this on the fluid automatically. That explains why we never received any other fluid studies from there. It is possible that the team there could speak with them to get them to do the studies with the next tap. Dr. Gee recap She had been less jaundiced and she says over the last 5 weeks people commented.  3m off alcohol and send over to the oltx,  Doing therapy with private therapist.  Refer to [place] as 3m and not getting better.  Did ct at [facility] and done for pain post tap and that was new and chills and Dr Tolentino and went in and colitis.  They gave her abx and bentyl.   Sept 20 labs show inr little up 1.4 but down from 1.5. Glucose 104 elevated but down from 132. Bun 15 and cr 0.67 and na 135 and k 4.5 and cl 101 and co2 23 and calcium 8.5 and little low and may want to share with primary and look at this and your vitamin d level status. Albumin little lower 3.4 from 3.7 prior. Total bili 9.4 and up from 6.2 and prior 7.3. Alk 113 and ast 35 and alt 16. Prior ast 9 and alt 18. Wbc 3.9 and hg 12.7 and mcv 101.7 elevated. Platelets low 81.  Rbc little low 3.47 and mch little up 36.6. Neutrophils 2543 and lymphs 1045 normal. Monocytes little low 191. Meld 19 and meld na 20. This went up from 17 and concerning to see.   She did tap friday and got 4.5 liters off and says did tap on side and not drain as much.  re diuretics she was on lasix 20mg and aldactone once day.  Can go up to bid on those and do labs next week and the following,  telemed in 2 weeks and see doing.  July 31st labs showed your iron sat was elevated at 55%.  We may need to do a hemochromatosis panel to follow-up on that to see why it is running high.  It could still also be high as well because of the alcohol use previously.  Ceruloplasmin was normal at 32. Ferritin was also again up in keeping with iron being up at 440. Hep B immunity was seen at a level of 105. Your alpha-1 screen was normal at MM type. Sugar was up at 132 and higher from July 15 when it was 115. BUN is 16 creatinine 0.65 sodium 135 potassium 4.6 calcium 8.9 albumin 3.7 normal, bilirubin was 6.2 down from 7.3 (not fractionated and need that done next time) and alk phos was 154 from 158 and AST 39 from 36 and ALT of 18 from 14. WBC was 4.2, hemoglobin 12.4 and platelet count low at 67.  MCV was 105. INR was elevated at 1.5 with a prothrombin time 17.4. No hep A immunity was seen.  You should consider getting the hepatitis A vaccine series electively. Summary: DF 17.2 so less than 32. Meld 18 and meld na 19 so less than 21 which is good to see but would want it to be less than 15 and above that.  Also, as you recall, you had previously in June when back in the hospital had a MELD score that had been less than 21 and a DF score of less than 32.  You had lost a substantial amount of weight also between your illness and your diuretics. Despite the high sugar your A1c was reported not to be up. Need to follow trends as more time passes.  Treatment is as we discussed supportive and to watch for any issues with your diuretics or labs over time. Continued abstinence is key.  recap:  Patient was last seen by them on July 14, 2023 for a posthospital visit.  She was apparently admitted on June 27, 2023 to a local facility for worsening abdominal distention and edema.  She had a history of alcohol usage. Pt at the time was having several glasses at night for years.   Dr Wills is for women having more 2 drinks a day will lead to cirrhosis over time.   Her father apparently passed away in March and she had been drinking heavily until then.  Alcohol stopped since June 25/26 and has not done any counseling.  Admission labs reported a hemoglobin of 12.2 platelet count 102 INR 1.6 bilirubin 4.3 alk phos 203 AST 69 ALT 20 lipase 97 ethanol level 222.  Hepatitis ABC panel was reported negative.  Patient had a MELD score reported to be 17 but do not have a DF score. Severe is  alc hep meld over 21.  But above the 15 cut off for oltx.   CT of the abdomen showed cirrhotic nodular liver as well as portal pretension and diffuse abdominal pelvic ascites.  June 28, 2023 EGD was reported normal.  June 27,011 and 11 L paracentesis was done on June 29 with 7.5 L paracentesis was removed.   July 18 7.5 L and 4th one this friday.  She is learing to be on low salt diet.  She had a tap tomorrow will be 2 weeks post the last one.  Patient was started on Lasix 20 and Aldactone 50 mg a day and was inc since to 40 and 100mg   Weight is down 75 from admit and part fluid and part not.  Still losing some.  The ascites may be better with the dose inc.  She was given lactulose mostly for constipation per their note and not for encephalopathy issues.  When seen on July 14 she was not on any special diet but had lost about 11 pounds more amd was 251 admit since leaving the hospital.  Weight was noted to be 194.4 pounds at the time with a BMI of 34.43.  Abdomen is reported to be distended then.  Local labs from July 14 showed AFP of 4.6.  With a sugar of 115 BUN of 12 creatinine 0.5 sodium 136 potassium 4.7 chloride 103 CO2 of 21 calcium 8.8 albumin diminished at 3.4 bilirubin elevated 7.3 alkaline phosphatase 158 AST of 36 and ALT of 14.  WBC 4.2 hemoglobin 13.3 plate count 94 .4 with neutrophils 2617 and lymphocytes 1205.  Prothrombin time was listed as 14.4 with normal up to 11.5 seconds there.  INR was 1.4.  Df 20.6  Meld 18/19.  Able to review some of the [facility] records directly and saw that on June 28, 2023 Dr. Perkins did the EGD and it was normal with the Z-line at 37 cm and the stomach and duodenum also appeared normal.  The June 27, 2023 admit consult mentions how patient had been complaining about edema for a week and had been using alcohol at least a bottle of wine per day for several years and then had cut back to 3 to 4 glasses a day but then when her father passed in March she was drinking more heavily and came in with the hemoglobin of 12.2 INR 1.6 platelets 102 bilirubin 4.3 alk phos 283 AST 69 ALT 20 lipase 97 and ethanol of 222.  Mention how the CT again has shown the diffuse abdominal pelvic ascites as well as sequela of portal pretension and the cirrhosis.  She never had a colonoscopy.  Previous EGD had been 20 years ago.  Other past medical history listed ADHD, alcohol use, anxiety, diabetes, ascites.  Jaundice.  A1c 4.4 % without meds.  Alcohol usage again was as listed above.  Denied tobacco usage.    CT scan had shown lower thorax with no effusion of the pleural or pericardial area but did a cirrhotic nodular liver with sequela of hypertension including gastroesophageal and splenic varices and moderate to large intra-abdominal ascites.  The fluid appeared simple.  Gallbladder was unremarkable.  No bile duct dilation seen.  Spleen was enlarged.  Splenic mass.  Perisplenic ascites were seen.  No significant bowel wall thickening or evidence of free air was seen.  Atherosclerotic calcifications were seen in the abdominal aorta and branches but without aneurysm.  They felt her MELD score was 17 at the time and they plan for the EGD to be done. July 18, 2023 ultrasound was 7.1 L. June 29, 2020 ultrasound 7.5 L. June 27, 2000 2311 L.  Discharge summary mentions she was admitted from the 27th of the 29th and that she was scheduled to follow-up with Dr. Cruz.  She was a 3m wait so was set up to see u.s.  They had recommended her to be on thiamine and folic acid.    She was kept on her Lasix and Aldactone after her paracentesis treatments.  She was monitored for withdrawal and they were not noted.  She was kept on her citalopram and Adderall for her anxiety/ADHD/depression.  She was reported to have type 2 diabetes but with an A1c of 4.4 and been off of medicines for 2 years.  Discharge medicines include citalopram 1 and half tablets at night, folic acid 100-day, Lasix 20 mgsa day, lactulose 15 mL once a day as needed, Aldactone 50 mg a day and thiamine 100 mg a day.

## 2023-10-11 NOTE — PHYSICAL EXAM GASTROINTESTINAL
Abdomen , soft, nontender, nondistended , no guarding or rigidity , no masses palpable , normal bowel sounds , Liver and Spleen , no hepatomegaly present , no hepatosplenomegaly , liver nontender , spleen not palpable  Moderna dose 1 and 2

## 2023-10-12 LAB
A/G RATIO: 1.2
ABSOLUTE BASOPHILS: 37
ABSOLUTE EOSINOPHILS: 0
ABSOLUTE LYMPHOCYTES: 962
ABSOLUTE MONOCYTES: 185
ABSOLUTE NEUTROPHILS: 2516
ALBUMIN: 3.6
ALKALINE PHOSPHATASE: 116
ALT (SGPT): 22
AST (SGOT): 45
BASOPHILS: 1
BILIRUBIN, TOTAL: 7.9
BUN/CREATININE RATIO: (no result)
BUN: 19
CALCIUM: 9.2
CARBON DIOXIDE, TOTAL: 23
CHLORIDE: 101
CREATININE: 0.71
EGFR: 110
EOSINOPHILS: 0
GLOBULIN, TOTAL: 3.1
GLUCOSE: 106
HEMATOCRIT: 35.2
HEMOGLOBIN: 12.8
INR: 1.4
LYMPHOCYTES: 26
MCH: 36.4
MCHC: 36.4
MCV: 100
MONOCYTES: 5
MPV: 11.2
NEUTROPHILS: 68
NOTE: (no result)
PLATELET COUNT: 66
PLATELET ESTIMATION: (no result)
POTASSIUM: 4.8
PROTEIN, TOTAL: 6.7
PT: 14.3
RDW: 11.9
RED BLOOD CELL COUNT: 3.52
SODIUM: 137
WHITE BLOOD CELL COUNT: 3.7

## 2023-10-13 ENCOUNTER — TELEPHONE ENCOUNTER (OUTPATIENT)
Dept: URBAN - METROPOLITAN AREA CLINIC 86 | Facility: CLINIC | Age: 38
End: 2023-10-13

## 2023-10-13 NOTE — HPI-TODAY'S VISIT:
Dear Lupe Song,  It was nice meeting you. The 10/11/23 labs were sent to me.  The glucose 106 which is elevated if you are fasting.  Creatinine 0.7, sodium 137, potassium 4.8, bilirubin 7.9, alkaline phosphatase 116, AST 45, ALT 22.  White blood cells low at 3.7, red blood cells 3.5, hemoglobin 12.8, , platelets 66.  INR 1.4.  The bilirubin slightly lower now as previously it was 8.8.  The AST at 45 and we will need to continue to monitor this. MELD 18. Please let us know if you have not been able to get the appointment with [facility] oltx team. We will see what they look like next with with increasing the aldactone in efforts to try to slow need for paracentesis.  Mary Kay Diamond PA-C

## 2023-10-16 ENCOUNTER — LAB OUTSIDE AN ENCOUNTER (OUTPATIENT)
Dept: URBAN - METROPOLITAN AREA CLINIC 86 | Facility: CLINIC | Age: 38
End: 2023-10-16

## 2023-10-24 ENCOUNTER — TELEPHONE ENCOUNTER (OUTPATIENT)
Dept: URBAN - METROPOLITAN AREA CLINIC 86 | Facility: CLINIC | Age: 38
End: 2023-10-24

## 2023-10-24 LAB
A/G RATIO: 1.3
ABSOLUTE BASOPHILS: 0
ABSOLUTE EOSINOPHILS: 175
ABSOLUTE LYMPHOCYTES: 1295
ABSOLUTE MONOCYTES: 175
ABSOLUTE NEUTROPHILS: 1855
ALBUMIN: 3.8
ALKALINE PHOSPHATASE: 122
ALT (SGPT): 24
AST (SGOT): 42
BASOPHILS: 0
BILIRUBIN, TOTAL: 7.5
BUN/CREATININE RATIO: (no result)
BUN: 18
CALCIUM: 8.9
CARBON DIOXIDE, TOTAL: 21
CHLORIDE: 103
COMMENT(S): (no result)
CREATININE: 0.62
EGFR: 115
EOSINOPHILS: 5
GLOBULIN, TOTAL: 3
GLUCOSE: 114
HEMATOCRIT: 38.1
HEMOGLOBIN: 12
INR: 1.3
LYMPHOCYTES: 37
MCH: 31.7
MCHC: 31.5
MCV: 100.5
MONOCYTES: 5
MPV: 10.7
NEUTROPHILS: 53
NOTE: (no result)
PLATELET COUNT: 71
POTASSIUM: 4.3
PROTEIN, TOTAL: 6.8
PT: 13.7
RDW: 13.2
RED BLOOD CELL COUNT: 3.79
SODIUM: 138
WHITE BLOOD CELL COUNT: 3.5

## 2023-10-26 ENCOUNTER — OFFICE VISIT (OUTPATIENT)
Dept: URBAN - METROPOLITAN AREA CLINIC 86 | Facility: CLINIC | Age: 38
End: 2023-10-26
Payer: COMMERCIAL

## 2023-10-26 VITALS
WEIGHT: 164 LBS | DIASTOLIC BLOOD PRESSURE: 73 MMHG | TEMPERATURE: 98.1 F | SYSTOLIC BLOOD PRESSURE: 119 MMHG | HEIGHT: 63 IN | HEART RATE: 108 BPM | BODY MASS INDEX: 29.06 KG/M2

## 2023-10-26 DIAGNOSIS — K76.82 HEPATIC ENCEPHALOPATHY: ICD-10-CM

## 2023-10-26 DIAGNOSIS — F10.91 ALCOHOL USE DISORDER IN REMISSION: ICD-10-CM

## 2023-10-26 DIAGNOSIS — K70.31 ALCOHOLIC CIRRHOSIS OF LIVER WITH ASCITES: ICD-10-CM

## 2023-10-26 DIAGNOSIS — K70.11 ALCOHOLIC HEPATITIS WITH ASCITES: ICD-10-CM

## 2023-10-26 PROCEDURE — 99214 OFFICE O/P EST MOD 30 MIN: CPT | Performed by: PHYSICIAN ASSISTANT

## 2023-10-26 RX ORDER — FUROSEMIDE 20 MG/1
1 TABLET TABLET ORAL TWICE A DAY
Qty: 60 TABLET | Refills: 1 | Status: ACTIVE | COMMUNITY
Start: 2023-07-18

## 2023-10-26 RX ORDER — SPIRONOLACTONE 50 MG/1
1 TABLET TABLET, FILM COATED ORAL TWICE A DAY
Qty: 60 TABLET | Refills: 1 | Status: ACTIVE | COMMUNITY

## 2023-10-26 RX ORDER — THIAMINE HCL 100 MG
1 TABLET TABLET ORAL ONCE A DAY
Status: ACTIVE | COMMUNITY

## 2023-10-26 RX ORDER — LACTULOSE 10 G/15ML
15 ML AS NEEDED SOLUTION ORAL ONCE A DAY
Qty: 450 ML | Refills: 1 | Status: ACTIVE | COMMUNITY
End: 2023-12-10

## 2023-10-26 RX ORDER — AMOXICILLIN AND CLAVULANATE POTASSIUM 500; 125 MG/1; 1/1
1 TABLET TABLET, FILM COATED ORAL
Status: ACTIVE | COMMUNITY

## 2023-10-26 RX ORDER — RIFAXIMIN 550 MG/1
1 TABLET TABLET ORAL
Qty: 60 | Refills: 3 | Status: ACTIVE | COMMUNITY
Start: 2023-09-27 | End: 2023-10-27

## 2023-10-26 RX ORDER — CITALOPRAM 20 MG/1
1 TABLET TABLET, FILM COATED ORAL ONCE A DAY
Status: ACTIVE | COMMUNITY

## 2023-10-26 RX ORDER — FOLIC ACID 1 MG/1
1 TABLET TABLET ORAL ONCE A DAY
Status: ACTIVE | COMMUNITY

## 2023-10-26 NOTE — HPI-TODAY'S VISIT:
Patient is a 38-year-old female Seen july 2023 at the request of Barb Espinosa nurse practitioner and Dr. Antoine Gomez for evaluation of recently noted cirrhosis suspected due to alcohol.    A copy of the note will be sent to the referring provider.   10/26/23 ov SHe has oltx appt on 1//28 she had nathen yesterday and removed 4.6 L Happy to see this.  THis was 9 days after previous, so sooner bc the previous had suction issues so not as much done.  She is shcedule through the end of the year   The 10/23/23 labs were sent to me.  The white blood cells low at 3.5 and we will continue to monitor.  Red blood cells 3.7, hemoglobin 12, .5 and this is elevated be sure you are taking the B12.  Platelets low at 71 and we will continue to monitor.  The glucose 114, creatinine 0.62, sodium 138, potassium 4.3, bilirubin 7.5, alkaline phosphatase 122, AST 42, ALT 24, INR 1.3.  Bilirubin slightly lower which is good to see.  INR down from 1.4 which is also good to see. MELD still up at 17  The 10/11/23 labs were sent to me. The glucose 106 which is elevated if you are fasting. Creatinine 0.7, sodium 137, potassium 4.8, bilirubin 7.9, alkaline phosphatase 116, AST 45, ALT 22. White blood cells low at 3.7, red blood cells 3.5, hemoglobin 12.8, , platelets 66. INR 1.4. The bilirubin slightly lower now as previously it was 8.8. The AST at 45 and we will need to continue to monitor this. MELD 18. Please let us know if you have not been able to get the appointment with [facility] oltx team. We will see what they look like next with with increasing the aldactone in efforts to try to slow need for paracentesis.    recap 10/10/23 ov Dear Lupe Song, Sept 27: Ammonia normal 48 and normal less than 72 umol/L. Would still add the xifaxan as we saw the asterixis clincially and you have the memory lapses and see how you do. Inr 1.3 down from 1.4 from 1.5. Glu 111 elevated from 104. Bun 14 and cr 0.7. Na 134 and k 4.3 and cl 100 and co2 20 and ca 8.5 little low. Albumin 3.3 and little lower and due to the taps and liver disease. Tb 8.8 and down 9.4. Alk 142 and up from 113. Ast 37 and alt 16. Prior ast 35 and alt 16. Wbc 5.3 hg 12 and mcv 100.8 up but little lower. Platelets 70 and down from 81. Neutrophils 3498 and lymphs 1643 normal but monocytes low 106. Meld 18 and meld na 20. Lets see how you do with the diuretics and lets follow the labs and weight trends closely. Called the pt to discuss labs. No side effects from the xifaxan and not seeing a change. Weight 177.2 and 1 pound up from last week. Nothing yet from [place].  She had paracentesis on 10/3/23 had paracentesis and took off 7.5 L.  She is now 166.  wbc 4.8 red blood cells 3.66 mcv 108.7, platelets 84, 13.4,  inr 1.6,  cr. 0.7, ast 43, alt 19, alp 151, tb 7.2 MELD 19 MELD na 23.   she  is taking the aldactone 50 and lasix 20 mg and we will go up on this  Dr Gee Dear Lupe Song, September 1 ultrasound sent in and it showed a 5.9 L tap. It states specifically they did not do any studies on the fluid as apparently they do not have a standing order lab study protocol requested for this. Again this is one of the limitations of the doing the taps elsewhere. September 22 ultrasound also sent in and 4.7 L was removed and again the fluid was discarded as there was no requested analysis and no standing order protocol to do so. Here they do this on the fluid automatically. That explains why we never received any other fluid studies from there. It is possible that the team there could speak with them to get them to do the studies with the next tap. Dr. Gee recap She had been less jaundiced and she says over the last 5 weeks people commented.  3m off alcohol and send over to the oltx,  Doing therapy with private therapist.  Refer to [place] as 3m and not getting better.  Did ct at [facility] and done for pain post tap and that was new and chills and Dr Tolentino and went in and colitis.  They gave her abx and bentyl.   Sept 20 labs show inr little up 1.4 but down from 1.5. Glucose 104 elevated but down from 132. Bun 15 and cr 0.67 and na 135 and k 4.5 and cl 101 and co2 23 and calcium 8.5 and little low and may want to share with primary and look at this and your vitamin d level status. Albumin little lower 3.4 from 3.7 prior. Total bili 9.4 and up from 6.2 and prior 7.3. Alk 113 and ast 35 and alt 16. Prior ast 9 and alt 18. Wbc 3.9 and hg 12.7 and mcv 101.7 elevated. Platelets low 81.  Rbc little low 3.47 and mch little up 36.6. Neutrophils 2543 and lymphs 1045 normal. Monocytes little low 191. Meld 19 and meld na 20. This went up from 17 and concerning to see.   She did tap friday and got 4.5 liters off and says did tap on side and not drain as much.  re diuretics she was on lasix 20mg and aldactone once day.  Can go up to bid on those and do labs next week and the following,  telemed in 2 weeks and see doing.  July 31st labs showed your iron sat was elevated at 55%.  We may need to do a hemochromatosis panel to follow-up on that to see why it is running high.  It could still also be high as well because of the alcohol use previously.  Ceruloplasmin was normal at 32. Ferritin was also again up in keeping with iron being up at 440. Hep B immunity was seen at a level of 105. Your alpha-1 screen was normal at MM type. Sugar was up at 132 and higher from July 15 when it was 115. BUN is 16 creatinine 0.65 sodium 135 potassium 4.6 calcium 8.9 albumin 3.7 normal, bilirubin was 6.2 down from 7.3 (not fractionated and need that done next time) and alk phos was 154 from 158 and AST 39 from 36 and ALT of 18 from 14. WBC was 4.2, hemoglobin 12.4 and platelet count low at 67.  MCV was 105. INR was elevated at 1.5 with a prothrombin time 17.4. No hep A immunity was seen.  You should consider getting the hepatitis A vaccine series electively. Summary: DF 17.2 so less than 32. Meld 18 and meld na 19 so less than 21 which is good to see but would want it to be less than 15 and above that.  Also, as you recall, you had previously in June when back in the hospital had a MELD score that had been less than 21 and a DF score of less than 32.  You had lost a substantial amount of weight also between your illness and your diuretics. Despite the high sugar your A1c was reported not to be up. Need to follow trends as more time passes.  Treatment is as we discussed supportive and to watch for any issues with your diuretics or labs over time. Continued abstinence is key.  recap:  Patient was last seen by them on July 14, 2023 for a posthospital visit.  She was apparently admitted on June 27, 2023 to a local facility for worsening abdominal distention and edema.  She had a history of alcohol usage. Pt at the time was having several glasses at night for years.   Dr Wills is for women having more 2 drinks a day will lead to cirrhosis over time.   Her father apparently passed away in March and she had been drinking heavily until then.  Alcohol stopped since June 25/26 and has not done any counseling.  Admission labs reported a hemoglobin of 12.2 platelet count 102 INR 1.6 bilirubin 4.3 alk phos 203 AST 69 ALT 20 lipase 97 ethanol level 222.  Hepatitis ABC panel was reported negative.  Patient had a MELD score reported to be 17 but do not have a DF score. Severe is  alc hep meld over 21.  But above the 15 cut off for oltx.   CT of the abdomen showed cirrhotic nodular liver as well as portal pretension and diffuse abdominal pelvic ascites.  June 28, 2023 EGD was reported normal.  June 27,011 and 11 L paracentesis was done on June 29 with 7.5 L paracentesis was removed.   July 18 7.5 L and 4th one this friday.  She is learing to be on low salt diet.  She had a tap tomorrow will be 2 weeks post the last one.  Patient was started on Lasix 20 and Aldactone 50 mg a day and was inc since to 40 and 100mg   Weight is down 75 from admit and part fluid and part not.  Still losing some.  The ascites may be better with the dose inc.  She was given lactulose mostly for constipation per their note and not for encephalopathy issues.  When seen on July 14 she was not on any special diet but had lost about 11 pounds more amd was 251 admit since leaving the hospital.  Weight was noted to be 194.4 pounds at the time with a BMI of 34.43.  Abdomen is reported to be distended then.  Local labs from July 14 showed AFP of 4.6.  With a sugar of 115 BUN of 12 creatinine 0.5 sodium 136 potassium 4.7 chloride 103 CO2 of 21 calcium 8.8 albumin diminished at 3.4 bilirubin elevated 7.3 alkaline phosphatase 158 AST of 36 and ALT of 14.  WBC 4.2 hemoglobin 13.3 plate count 94 .4 with neutrophils 2617 and lymphocytes 1205.  Prothrombin time was listed as 14.4 with normal up to 11.5 seconds there.  INR was 1.4.  Df 20.6  Meld 18/19.  Able to review some of the [facility] records directly and saw that on June 28, 2023 Dr. Perkins did the EGD and it was normal with the Z-line at 37 cm and the stomach and duodenum also appeared normal.  The June 27, 2023 admit consult mentions how patient had been complaining about edema for a week and had been using alcohol at least a bottle of wine per day for several years and then had cut back to 3 to 4 glasses a day but then when her father passed in March she was drinking more heavily and came in with the hemoglobin of 12.2 INR 1.6 platelets 102 bilirubin 4.3 alk phos 283 AST 69 ALT 20 lipase 97 and ethanol of 222.  Mention how the CT again has shown the diffuse abdominal pelvic ascites as well as sequela of portal pretension and the cirrhosis.  She never had a colonoscopy.  Previous EGD had been 20 years ago.  Other past medical history listed ADHD, alcohol use, anxiety, diabetes, ascites.  Jaundice.  A1c 4.4 % without meds.  Alcohol usage again was as listed above.  Denied tobacco usage.    CT scan had shown lower thorax with no effusion of the pleural or pericardial area but did a cirrhotic nodular liver with sequela of hypertension including gastroesophageal and splenic varices and moderate to large intra-abdominal ascites.  The fluid appeared simple.  Gallbladder was unremarkable.  No bile duct dilation seen.  Spleen was enlarged.  Splenic mass.  Perisplenic ascites were seen.  No significant bowel wall thickening or evidence of free air was seen.  Atherosclerotic calcifications were seen in the abdominal aorta and branches but without aneurysm.  They felt her MELD score was 17 at the time and they plan for the EGD to be done. July 18, 2023 ultrasound was 7.1 L. June 29, 2020 ultrasound 7.5 L. June 27, 2000 2311 L.  Discharge summary mentions she was admitted from the 27th of the 29th and that she was scheduled to follow-up with Dr. Cruz.  She was a 3m wait so was set up to see u.s.  They had recommended her to be on thiamine and folic acid.    She was kept on her Lasix and Aldactone after her paracentesis treatments.  She was monitored for withdrawal and they were not noted.  She was kept on her citalopram and Adderall for her anxiety/ADHD/depression.  She was reported to have type 2 diabetes but with an A1c of 4.4 and been off of medicines for 2 years.  Discharge medicines include citalopram 1 and half tablets at night, folic acid 100-day, Lasix 20 mgsa day, lactulose 15 mL once a day as needed, Aldactone 50 mg a day and thiamine 100 mg a day.

## 2023-10-31 ENCOUNTER — LAB OUTSIDE AN ENCOUNTER (OUTPATIENT)
Dept: URBAN - METROPOLITAN AREA CLINIC 86 | Facility: CLINIC | Age: 38
End: 2023-10-31

## 2023-11-03 ENCOUNTER — TELEPHONE ENCOUNTER (OUTPATIENT)
Dept: URBAN - METROPOLITAN AREA CLINIC 86 | Facility: CLINIC | Age: 38
End: 2023-11-03

## 2023-11-03 LAB
A/G RATIO: 1.3
ABSOLUTE BASOPHILS: 31
ABSOLUTE EOSINOPHILS: 51
ABSOLUTE LYMPHOCYTES: 629
ABSOLUTE MONOCYTES: 221
ABSOLUTE NEUTROPHILS: 2468
ALBUMIN: 3.5
ALKALINE PHOSPHATASE: 122
ALT (SGPT): 23
AST (SGOT): 43
BASOPHILS: 0.9
BILIRUBIN, TOTAL: 7.6
BUN/CREATININE RATIO: (no result)
BUN: 25
CALCIUM: 8.7
CARBON DIOXIDE, TOTAL: 23
CHLORIDE: 104
CREATININE: 0.56
EGFR: 118
EOSINOPHILS: 1.5
GLOBULIN, TOTAL: 2.8
GLUCOSE: 140
HEMATOCRIT: 33.7
HEMOGLOBIN: 12.3
INR: 1.4
LYMPHOCYTES: 18.5
MCH: 35.9
MCHC: 36.5
MCV: 98.3
MONOCYTES: 6.5
MPV: 11.4
NEUTROPHILS: 72.6
PLATELET COUNT: 54
POTASSIUM: 4.3
PROTEIN, TOTAL: 6.3
PT: 14.2
RDW: 11.7
RED BLOOD CELL COUNT: 3.43
SODIUM: 135
WHITE BLOOD CELL COUNT: 3.4

## 2023-11-06 ENCOUNTER — ERX REFILL RESPONSE (OUTPATIENT)
Dept: URBAN - METROPOLITAN AREA CLINIC 80 | Facility: CLINIC | Age: 38
End: 2023-11-06

## 2023-11-06 ENCOUNTER — OFFICE VISIT (OUTPATIENT)
Dept: URBAN - METROPOLITAN AREA TELEHEALTH 2 | Facility: TELEHEALTH | Age: 38
End: 2023-11-06
Payer: COMMERCIAL

## 2023-11-06 DIAGNOSIS — K76.82 HEPATIC ENCEPHALOPATHY: ICD-10-CM

## 2023-11-06 DIAGNOSIS — F10.91 ALCOHOL USE DISORDER IN REMISSION: ICD-10-CM

## 2023-11-06 DIAGNOSIS — K70.11 ALCOHOLIC HEPATITIS WITH ASCITES: ICD-10-CM

## 2023-11-06 DIAGNOSIS — K70.31 ALCOHOLIC CIRRHOSIS OF LIVER WITH ASCITES: ICD-10-CM

## 2023-11-06 PROCEDURE — 99214 OFFICE O/P EST MOD 30 MIN: CPT | Performed by: PHYSICIAN ASSISTANT

## 2023-11-06 RX ORDER — SPIRONOLACTONE 100 MG/1
1 TABLET TABLET, FILM COATED ORAL DAILY
Qty: 30 TABLET | Refills: 1 | Status: ACTIVE | COMMUNITY

## 2023-11-06 RX ORDER — CITALOPRAM 20 MG/1
1 TABLET TABLET, FILM COATED ORAL ONCE A DAY
Status: ACTIVE | COMMUNITY

## 2023-11-06 RX ORDER — THIAMINE HCL 100 MG
1 TABLET TABLET ORAL ONCE A DAY
Status: ACTIVE | COMMUNITY

## 2023-11-06 RX ORDER — LACTULOSE 10 G/15ML
15 ML AS NEEDED SOLUTION ORAL ONCE A DAY
Qty: 450 ML | Refills: 1 | Status: ACTIVE | COMMUNITY
End: 2023-12-10

## 2023-11-06 RX ORDER — FOLIC ACID 1 MG/1
1 TABLET TABLET ORAL ONCE A DAY
Status: ACTIVE | COMMUNITY

## 2023-11-06 RX ORDER — SPIRONOLACTONE 100 MG/1
TAKE ONE TABLET BY MOUTH ONE TIME DAILY TABLET ORAL
Qty: 30 TABLET | Refills: 2 | OUTPATIENT

## 2023-11-06 RX ORDER — RIFAXIMIN 550 MG/1
1 TABLET TABLET ORAL TWICE A DAY
Status: ACTIVE | COMMUNITY

## 2023-11-06 RX ORDER — FUROSEMIDE 20 MG/1
1 TABLET TABLET ORAL TWICE A DAY
Qty: 60 TABLET | Refills: 1 | Status: ACTIVE | COMMUNITY
Start: 2023-07-18

## 2023-11-06 NOTE — HPI-TODAY'S VISIT:
Patient is a 38-year-old female Seen july 2023 at the request of Barb Espinosa nurse practitioner and Dr. Antoine Gomez for evaluation of recently noted cirrhosis suspected due to alcohol.    A copy of the note will be sent to the referring provider.  11/6/23 telemedicine  11/2/23 labs were sent to me.,  Hemoglobin 12.3, MCV 98, platelets 54 and this is low.  Glucose 140, creatinine 0.56, sodium 135, potassium 4.3, bilirubin 7.6, alkaline phosphatase 122, AST 43, ALT 23.  INR 1.4, back up. MELD up at 19, MELD-na 19.   she is using the bathroom more so diuretics may be helping weight 170 and still doing paracentesis every 2 weeks so not supriya to be any different she gains on average 10 lbs between them  still have appt 11/28/23 with dr. leo.    recap 10/26/23 ov SHe has oltx appt on 11/28 she had nathen yesterday and removed 4.6 L Happy to see this.  THis was 9 days after previous, so sooner bc the previous had suction issues so not as much done.  She is shcedule through the end of the year   The 10/23/23 labs were sent to me.  The white blood cells low at 3.5 and we will continue to monitor.  Red blood cells 3.7, hemoglobin 12, .5 and this is elevated be sure you are taking the B12.  Platelets low at 71 and we will continue to monitor.  The glucose 114, creatinine 0.62, sodium 138, potassium 4.3, bilirubin 7.5, alkaline phosphatase 122, AST 42, ALT 24, INR 1.3.  Bilirubin slightly lower which is good to see.  INR down from 1.4 which is also good to see. MELD still up at 17  The 10/11/23 labs were sent to me. The glucose 106 which is elevated if you are fasting. Creatinine 0.7, sodium 137, potassium 4.8, bilirubin 7.9, alkaline phosphatase 116, AST 45, ALT 22. White blood cells low at 3.7, red blood cells 3.5, hemoglobin 12.8, , platelets 66. INR 1.4. The bilirubin slightly lower now as previously it was 8.8. The AST at 45 and we will need to continue to monitor this. MELD 18. Please let us know if you have not been able to get the appointment with [facility] oltx team. We will see what they look like next with with increasing the aldactone in efforts to try to slow need for paracentesis.    recap 10/10/23 guerita Dear Lupe Song, Sept 27: Ammonia normal 48 and normal less than 72 umol/L. Would still add the xifaxan as we saw the asterixis clincially and you have the memory lapses and see how you do. Inr 1.3 down from 1.4 from 1.5. Glu 111 elevated from 104. Bun 14 and cr 0.7. Na 134 and k 4.3 and cl 100 and co2 20 and ca 8.5 little low. Albumin 3.3 and little lower and due to the taps and liver disease. Tb 8.8 and down 9.4. Alk 142 and up from 113. Ast 37 and alt 16. Prior ast 35 and alt 16. Wbc 5.3 hg 12 and mcv 100.8 up but little lower. Platelets 70 and down from 81. Neutrophils 3498 and lymphs 1643 normal but monocytes low 106. Meld 18 and meld na 20. Lets see how you do with the diuretics and lets follow the labs and weight trends closely. Called the pt to discuss labs. No side effects from the xifaxan and not seeing a change. Weight 177.2 and 1 pound up from last week. Nothing yet from [place].  She had paracentesis on 10/3/23 had paracentesis and took off 7.5 L.  She is now 166.  wbc 4.8 red blood cells 3.66 mcv 108.7, platelets 84, 13.4,  inr 1.6,  cr. 0.7, ast 43, alt 19, alp 151, tb 7.2 MELD 19 MELD na 23.   she  is taking the aldactone 50 and lasix 20 mg and we will go up on this  Dr Gee Dear Lupe Song, September 1 ultrasound sent in and it showed a 5.9 L tap. It states specifically they did not do any studies on the fluid as apparently they do not have a standing order lab study protocol requested for this. Again this is one of the limitations of the doing the taps elsewhere. September 22 ultrasound also sent in and 4.7 L was removed and again the fluid was discarded as there was no requested analysis and no standing order protocol to do so. Here they do this on the fluid automatically. That explains why we never received any other fluid studies from there. It is possible that the team there could speak with them to get them to do the studies with the next tap. Dr. Gee recap She had been less jaundiced and she says over the last 5 weeks people commented.  3m off alcohol and send over to the oltx,  Doing therapy with private therapist.  Refer to [place] as 3m and not getting better.  Did ct at [facility] and done for pain post tap and that was new and chills and Dr Tolentino and went in and colitis.  They gave her abx and bentyl.   Sept 20 labs show inr little up 1.4 but down from 1.5. Glucose 104 elevated but down from 132. Bun 15 and cr 0.67 and na 135 and k 4.5 and cl 101 and co2 23 and calcium 8.5 and little low and may want to share with primary and look at this and your vitamin d level status. Albumin little lower 3.4 from 3.7 prior. Total bili 9.4 and up from 6.2 and prior 7.3. Alk 113 and ast 35 and alt 16. Prior ast 9 and alt 18. Wbc 3.9 and hg 12.7 and mcv 101.7 elevated. Platelets low 81.  Rbc little low 3.47 and mch little up 36.6. Neutrophils 2543 and lymphs 1045 normal. Monocytes little low 191. Meld 19 and meld na 20. This went up from 17 and concerning to see.   She did tap friday and got 4.5 liters off and says did tap on side and not drain as much.  re diuretics she was on lasix 20mg and aldactone once day.  Can go up to bid on those and do labs next week and the following,  telemed in 2 weeks and see doing.  July 31st labs showed your iron sat was elevated at 55%.  We may need to do a hemochromatosis panel to follow-up on that to see why it is running high.  It could still also be high as well because of the alcohol use previously.  Ceruloplasmin was normal at 32. Ferritin was also again up in keeping with iron being up at 440. Hep B immunity was seen at a level of 105. Your alpha-1 screen was normal at MM type. Sugar was up at 132 and higher from July 15 when it was 115. BUN is 16 creatinine 0.65 sodium 135 potassium 4.6 calcium 8.9 albumin 3.7 normal, bilirubin was 6.2 down from 7.3 (not fractionated and need that done next time) and alk phos was 154 from 158 and AST 39 from 36 and ALT of 18 from 14. WBC was 4.2, hemoglobin 12.4 and platelet count low at 67.  MCV was 105. INR was elevated at 1.5 with a prothrombin time 17.4. No hep A immunity was seen.  You should consider getting the hepatitis A vaccine series electively. Summary: DF 17.2 so less than 32. Meld 18 and meld na 19 so less than 21 which is good to see but would want it to be less than 15 and above that.  Also, as you recall, you had previously in June when back in the hospital had a MELD score that had been less than 21 and a DF score of less than 32.  You had lost a substantial amount of weight also between your illness and your diuretics. Despite the high sugar your A1c was reported not to be up. Need to follow trends as more time passes.  Treatment is as we discussed supportive and to watch for any issues with your diuretics or labs over time. Continued abstinence is key.  recap:  Patient was last seen by them on July 14, 2023 for a posthospital visit.  She was apparently admitted on June 27, 2023 to a local facility for worsening abdominal distention and edema.  She had a history of alcohol usage. Pt at the time was having several glasses at night for years.   Dr Wills is for women having more 2 drinks a day will lead to cirrhosis over time.   Her father apparently passed away in March and she had been drinking heavily until then.  Alcohol stopped since June 25/26 and has not done any counseling.  Admission labs reported a hemoglobin of 12.2 platelet count 102 INR 1.6 bilirubin 4.3 alk phos 203 AST 69 ALT 20 lipase 97 ethanol level 222.  Hepatitis ABC panel was reported negative.  Patient had a MELD score reported to be 17 but do not have a DF score. Severe is  alc hep meld over 21.  But above the 15 cut off for oltx.   CT of the abdomen showed cirrhotic nodular liver as well as portal pretension and diffuse abdominal pelvic ascites.  June 28, 2023 EGD was reported normal.  June 27,011 and 11 L paracentesis was done on June 29 with 7.5 L paracentesis was removed.   July 18 7.5 L and 4th one this friday.  She is learing to be on low salt diet.  She had a tap tomorrow will be 2 weeks post the last one.  Patient was started on Lasix 20 and Aldactone 50 mg a day and was inc since to 40 and 100mg   Weight is down 75 from admit and part fluid and part not.  Still losing some.  The ascites may be better with the dose inc.  She was given lactulose mostly for constipation per their note and not for encephalopathy issues.  When seen on July 14 she was not on any special diet but had lost about 11 pounds more amd was 251 admit since leaving the hospital.  Weight was noted to be 194.4 pounds at the time with a BMI of 34.43.  Abdomen is reported to be distended then.  Local labs from July 14 showed AFP of 4.6.  With a sugar of 115 BUN of 12 creatinine 0.5 sodium 136 potassium 4.7 chloride 103 CO2 of 21 calcium 8.8 albumin diminished at 3.4 bilirubin elevated 7.3 alkaline phosphatase 158 AST of 36 and ALT of 14.  WBC 4.2 hemoglobin 13.3 plate count 94 .4 with neutrophils 2617 and lymphocytes 1205.  Prothrombin time was listed as 14.4 with normal up to 11.5 seconds there.  INR was 1.4.  Df 20.6  Meld 18/19.  Able to review some of the [facility] records directly and saw that on June 28, 2023 Dr. Perkins did the EGD and it was normal with the Z-line at 37 cm and the stomach and duodenum also appeared normal.  The June 27, 2023 admit consult mentions how patient had been complaining about edema for a week and had been using alcohol at least a bottle of wine per day for several years and then had cut back to 3 to 4 glasses a day but then when her father passed in March she was drinking more heavily and came in with the hemoglobin of 12.2 INR 1.6 platelets 102 bilirubin 4.3 alk phos 283 AST 69 ALT 20 lipase 97 and ethanol of 222.  Mention how the CT again has shown the diffuse abdominal pelvic ascites as well as sequela of portal pretension and the cirrhosis.  She never had a colonoscopy.  Previous EGD had been 20 years ago.  Other past medical history listed ADHD, alcohol use, anxiety, diabetes, ascites.  Jaundice.  A1c 4.4 % without meds.  Alcohol usage again was as listed above.  Denied tobacco usage.    CT scan had shown lower thorax with no effusion of the pleural or pericardial area but did a cirrhotic nodular liver with sequela of hypertension including gastroesophageal and splenic varices and moderate to large intra-abdominal ascites.  The fluid appeared simple.  Gallbladder was unremarkable.  No bile duct dilation seen.  Spleen was enlarged.  Splenic mass.  Perisplenic ascites were seen.  No significant bowel wall thickening or evidence of free air was seen.  Atherosclerotic calcifications were seen in the abdominal aorta and branches but without aneurysm.  They felt her MELD score was 17 at the time and they plan for the EGD to be done. July 18, 2023 ultrasound was 7.1 L. June 29, 2020 ultrasound 7.5 L. June 27, 2000 2311 L.  Discharge summary mentions she was admitted from the 27th of the 29th and that she was scheduled to follow-up with Dr. Cruz.  She was a 3m wait so was set up to see u.s.  They had recommended her to be on thiamine and folic acid.    She was kept on her Lasix and Aldactone after her paracentesis treatments.  She was monitored for withdrawal and they were not noted.  She was kept on her citalopram and Adderall for her anxiety/ADHD/depression.  She was reported to have type 2 diabetes but with an A1c of 4.4 and been off of medicines for 2 years.  Discharge medicines include citalopram 1 and half tablets at night, folic acid 100-day, Lasix 20 mgsa day, lactulose 15 mL once a day as needed, Aldactone 50 mg a day and thiamine 100 mg a day.

## 2023-11-13 ENCOUNTER — OFFICE VISIT (OUTPATIENT)
Dept: URBAN - METROPOLITAN AREA CLINIC 85 | Facility: CLINIC | Age: 38
End: 2023-11-13

## 2023-11-20 ENCOUNTER — LAB OUTSIDE AN ENCOUNTER (OUTPATIENT)
Dept: URBAN - METROPOLITAN AREA TELEHEALTH 2 | Facility: TELEHEALTH | Age: 38
End: 2023-11-20

## 2023-11-22 ENCOUNTER — WEB ENCOUNTER (OUTPATIENT)
Dept: URBAN - METROPOLITAN AREA CLINIC 86 | Facility: CLINIC | Age: 38
End: 2023-11-22

## 2023-11-22 LAB
A/G RATIO: 1
ABSOLUTE BASOPHILS: 30
ABSOLUTE EOSINOPHILS: 129
ABSOLUTE LYMPHOCYTES: 1122
ABSOLUTE MONOCYTES: 267
ABSOLUTE NEUTROPHILS: 2752
ALBUMIN: 3.3
ALKALINE PHOSPHATASE: 130
ALT (SGPT): 19
AST (SGOT): 35
BASOPHILS: 0.7
BILIRUBIN, TOTAL: 6.9
BUN/CREATININE RATIO: (no result)
BUN: 20
CALCIUM: 8.8
CARBON DIOXIDE, TOTAL: 23
CHLORIDE: 100
CREATININE: 0.59
EGFR: 117
EOSINOPHILS: 3
GLOBULIN, TOTAL: 3.4
GLUCOSE: 114
HEMATOCRIT: 36.3
HEMOGLOBIN: 13.2
INR: 1.3
LYMPHOCYTES: 26.1
MCH: 37.1
MCHC: 36.4
MCV: 102
MONOCYTES: 6.2
MPV: 9.7
NEUTROPHILS: 64
PLATELET COUNT: 81
POTASSIUM: 4.8
PROTEIN, TOTAL: 6.7
PT: 13.5
RDW: 13.2
RED BLOOD CELL COUNT: 3.56
SODIUM: 135
WHITE BLOOD CELL COUNT: 4.3

## 2023-11-28 ENCOUNTER — ERX REFILL RESPONSE (OUTPATIENT)
Dept: URBAN - METROPOLITAN AREA CLINIC 86 | Facility: CLINIC | Age: 38
End: 2023-11-28

## 2023-11-28 RX ORDER — FUROSEMIDE 20 MG/1
TAKE ONE TABLET BY MOUTH TWICE A DAY TABLET ORAL
Qty: 60 TABLET | Refills: 1 | OUTPATIENT

## 2023-11-28 RX ORDER — FUROSEMIDE 20 MG/1
1 TABLET TABLET ORAL TWICE A DAY
Qty: 60 TABLET | Refills: 1 | OUTPATIENT

## 2023-12-04 ENCOUNTER — LAB OUTSIDE AN ENCOUNTER (OUTPATIENT)
Dept: URBAN - METROPOLITAN AREA CLINIC 86 | Facility: CLINIC | Age: 38
End: 2023-12-04

## 2023-12-05 ENCOUNTER — ERX REFILL RESPONSE (OUTPATIENT)
Dept: URBAN - METROPOLITAN AREA CLINIC 80 | Facility: CLINIC | Age: 38
End: 2023-12-05

## 2023-12-05 RX ORDER — SPIRONOLACTONE 100 MG/1
TAKE ONE TABLET BY MOUTH ONE TIME DAILY TABLET ORAL
Qty: 30 TABLET | Refills: 1 | OUTPATIENT

## 2023-12-05 RX ORDER — SPIRONOLACTONE 100 MG/1
TAKE ONE TABLET BY MOUTH ONE TIME DAILY TABLET ORAL
Qty: 30 TABLET | Refills: 2 | OUTPATIENT

## 2023-12-06 ENCOUNTER — CLAIMS CREATED FROM THE CLAIM WINDOW (OUTPATIENT)
Dept: URBAN - METROPOLITAN AREA TELEHEALTH 2 | Facility: TELEHEALTH | Age: 38
End: 2023-12-06
Payer: COMMERCIAL

## 2023-12-06 ENCOUNTER — LAB OUTSIDE AN ENCOUNTER (OUTPATIENT)
Dept: URBAN - METROPOLITAN AREA TELEHEALTH 2 | Facility: TELEHEALTH | Age: 38
End: 2023-12-06

## 2023-12-06 VITALS — HEIGHT: 63 IN

## 2023-12-06 DIAGNOSIS — K58.8 OTHER IRRITABLE BOWEL SYNDROME: ICD-10-CM

## 2023-12-06 DIAGNOSIS — E11.9 DIABETES: ICD-10-CM

## 2023-12-06 DIAGNOSIS — Z79.899 HIGH RISK MEDICATION USE: ICD-10-CM

## 2023-12-06 DIAGNOSIS — K76.82 HEPATIC ENCEPHALOPATHY: ICD-10-CM

## 2023-12-06 DIAGNOSIS — K70.31 ALCOHOLIC CIRRHOSIS OF LIVER WITH ASCITES: ICD-10-CM

## 2023-12-06 DIAGNOSIS — F10.91 ALCOHOL USE DISORDER IN REMISSION: ICD-10-CM

## 2023-12-06 DIAGNOSIS — F90.9 ADHD: ICD-10-CM

## 2023-12-06 DIAGNOSIS — Z71.85 VACCINE COUNSELING: ICD-10-CM

## 2023-12-06 DIAGNOSIS — K58.9 IRRITABLE BOWEL SYNDROME: ICD-10-CM

## 2023-12-06 DIAGNOSIS — K70.11 ALCOHOLIC HEPATITIS WITH ASCITES: ICD-10-CM

## 2023-12-06 DIAGNOSIS — F41.9 ANXIETY AND DEPRESSION: ICD-10-CM

## 2023-12-06 PROCEDURE — 99214 OFFICE O/P EST MOD 30 MIN: CPT | Performed by: PHYSICIAN ASSISTANT

## 2023-12-06 RX ORDER — LACTULOSE 10 G/15ML
15 ML AS NEEDED SOLUTION ORAL ONCE A DAY
Qty: 450 ML | Refills: 1 | Status: ACTIVE | COMMUNITY
End: 2023-12-10

## 2023-12-06 RX ORDER — THIAMINE HCL 100 MG
1 TABLET TABLET ORAL ONCE A DAY
Status: ACTIVE | COMMUNITY

## 2023-12-06 RX ORDER — SPIRONOLACTONE 100 MG/1
1 TABLET TABLET, FILM COATED ORAL DAILY
Qty: 30 TABLET | Refills: 1 | Status: ACTIVE | COMMUNITY

## 2023-12-06 RX ORDER — FUROSEMIDE 20 MG/1
TAKE ONE TABLET BY MOUTH TWICE A DAY TABLET ORAL
Qty: 60 TABLET | Refills: 1 | Status: ACTIVE | COMMUNITY

## 2023-12-06 RX ORDER — FOLIC ACID 1 MG/1
1 TABLET TABLET ORAL ONCE A DAY
Status: ACTIVE | COMMUNITY

## 2023-12-06 RX ORDER — CITALOPRAM 20 MG/1
1 TABLET TABLET, FILM COATED ORAL ONCE A DAY
Status: ACTIVE | COMMUNITY

## 2023-12-06 RX ORDER — RIFAXIMIN 550 MG/1
1 TABLET TABLET ORAL TWICE A DAY
Status: ACTIVE | COMMUNITY

## 2023-12-06 RX ORDER — SPIRONOLACTONE 100 MG/1
TAKE ONE TABLET BY MOUTH ONE TIME DAILY TABLET ORAL
Qty: 30 TABLET | Refills: 1 | Status: ACTIVE | COMMUNITY

## 2023-12-06 NOTE — HPI-TODAY'S VISIT:
Patient is a 38-year-old female Seen july 2023 at the request of Barb Espinosa nurse practitioner and Dr. Antoine Gomez for evaluation of recently noted cirrhosis suspected due to alcohol.    A copy of the note will be sent to the referring provider.   12/6/23 healow MELD 22 on 11/28/23 labs  she was at the ER this weekend she had complex migraine she saw dr. leo starting oltx  she is now  200 mg aldactone and 80 mg lasix and takign all in am  she had labs done on 12/3/23 at er and stable cr and lytes  she will do day long visit soon to start tolx process\  sh eis still doing paracentesis  She is doing a new counselor this after    RECAP 11/6/23 telemedicine  11/2/23 labs were sent to me.,  Hemoglobin 12.3, MCV 98, platelets 54 and this is low.  Glucose 140, creatinine 0.56, sodium 135, potassium 4.3, bilirubin 7.6, alkaline phosphatase 122, AST 43, ALT 23.  INR 1.4, back up. MELD up at 19, MELD-na 19.   she is using the bathroom more so diuretics may be helping weight 170 and still doing paracentesis every 2 weeks so not supriya to be any different she gains on average 10 lbs between them  still have appt 11/28/23 with dr. leo.    10/26/23 ov SHe has oltx appt on 11/28 she had nathen yesterday and removed 4.6 L Happy to see this.  THis was 9 days after previous, so sooner bc the previous had suction issues so not as much done.  She is shcedule through the end of the year   The 10/23/23 labs were sent to me.  The white blood cells low at 3.5 and we will continue to monitor.  Red blood cells 3.7, hemoglobin 12, .5 and this is elevated be sure you are taking the B12.  Platelets low at 71 and we will continue to monitor.  The glucose 114, creatinine 0.62, sodium 138, potassium 4.3, bilirubin 7.5, alkaline phosphatase 122, AST 42, ALT 24, INR 1.3.  Bilirubin slightly lower which is good to see.  INR down from 1.4 which is also good to see. MELD still up at 17  The 10/11/23 labs were sent to me. The glucose 106 which is elevated if you are fasting. Creatinine 0.7, sodium 137, potassium 4.8, bilirubin 7.9, alkaline phosphatase 116, AST 45, ALT 22. White blood cells low at 3.7, red blood cells 3.5, hemoglobin 12.8, , platelets 66. INR 1.4. The bilirubin slightly lower now as previously it was 8.8. The AST at 45 and we will need to continue to monitor this. MELD 18. Please let us know if you have not been able to get the appointment with [facility] oltx team. We will see what they look like next with with increasing the aldactone in efforts to try to slow need for paracentesis.    recap 10/10/23 ov Dear Lupe oSng, Sept 27: Ammonia normal 48 and normal less than 72 umol/L. Would still add the xifaxan as we saw the asterixis clincially and you have the memory lapses and see how you do. Inr 1.3 down from 1.4 from 1.5. Glu 111 elevated from 104. Bun 14 and cr 0.7. Na 134 and k 4.3 and cl 100 and co2 20 and ca 8.5 little low. Albumin 3.3 and little lower and due to the taps and liver disease. Tb 8.8 and down 9.4. Alk 142 and up from 113. Ast 37 and alt 16. Prior ast 35 and alt 16. Wbc 5.3 hg 12 and mcv 100.8 up but little lower. Platelets 70 and down from 81. Neutrophils 3498 and lymphs 1643 normal but monocytes low 106. Meld 18 and meld na 20. Lets see how you do with the diuretics and lets follow the labs and weight trends closely. Called the pt to discuss labs. No side effects from the xifaxan and not seeing a change. Weight 177.2 and 1 pound up from last week. Nothing yet from [place].  She had paracentesis on 10/3/23 had paracentesis and took off 7.5 L.  She is now 166.  wbc 4.8 red blood cells 3.66 mcv 108.7, platelets 84, 13.4,  inr 1.6,  cr. 0.7, ast 43, alt 19, alp 151, tb 7.2 MELD 19 MELD na 23.   she  is taking the aldactone 50 and lasix 20 mg and we will go up on this  Dr Gee Dear Lupe Song, September 1 ultrasound sent in and it showed a 5.9 L tap. It states specifically they did not do any studies on the fluid as apparently they do not have a standing order lab study protocol requested for this. Again this is one of the limitations of the doing the taps elsewhere. September 22 ultrasound also sent in and 4.7 L was removed and again the fluid was discarded as there was no requested analysis and no standing order protocol to do so. Here they do this on the fluid automatically. That explains why we never received any other fluid studies from there. It is possible that the team there could speak with them to get them to do the studies with the next tap. Dr. Gee recap She had been less jaundiced and she says over the last 5 weeks people commented.  3m off alcohol and send over to the oltx,  Doing therapy with private therapist.  Refer to [place] as 3m and not getting better.  Did ct at [facility] and done for pain post tap and that was new and chills and Dr Tolentino and went in and colitis.  They gave her abx and bentyl.   Sept 20 labs show inr little up 1.4 but down from 1.5. Glucose 104 elevated but down from 132. Bun 15 and cr 0.67 and na 135 and k 4.5 and cl 101 and co2 23 and calcium 8.5 and little low and may want to share with primary and look at this and your vitamin d level status. Albumin little lower 3.4 from 3.7 prior. Total bili 9.4 and up from 6.2 and prior 7.3. Alk 113 and ast 35 and alt 16. Prior ast 9 and alt 18. Wbc 3.9 and hg 12.7 and mcv 101.7 elevated. Platelets low 81.  Rbc little low 3.47 and mch little up 36.6. Neutrophils 2543 and lymphs 1045 normal. Monocytes little low 191. Meld 19 and meld na 20. This went up from 17 and concerning to see.   She did tap friday and got 4.5 liters off and says did tap on side and not drain as much.  re diuretics she was on lasix 20mg and aldactone once day.  Can go up to bid on those and do labs next week and the following,  telemed in 2 weeks and see doing.  July 31st labs showed your iron sat was elevated at 55%.  We may need to do a hemochromatosis panel to follow-up on that to see why it is running high.  It could still also be high as well because of the alcohol use previously.  Ceruloplasmin was normal at 32. Ferritin was also again up in keeping with iron being up at 440. Hep B immunity was seen at a level of 105. Your alpha-1 screen was normal at MM type. Sugar was up at 132 and higher from July 15 when it was 115. BUN is 16 creatinine 0.65 sodium 135 potassium 4.6 calcium 8.9 albumin 3.7 normal, bilirubin was 6.2 down from 7.3 (not fractionated and need that done next time) and alk phos was 154 from 158 and AST 39 from 36 and ALT of 18 from 14. WBC was 4.2, hemoglobin 12.4 and platelet count low at 67.  MCV was 105. INR was elevated at 1.5 with a prothrombin time 17.4. No hep A immunity was seen.  You should consider getting the hepatitis A vaccine series electively. Summary: DF 17.2 so less than 32. Meld 18 and meld na 19 so less than 21 which is good to see but would want it to be less than 15 and above that.  Also, as you recall, you had previously in June when back in the hospital had a MELD score that had been less than 21 and a DF score of less than 32.  You had lost a substantial amount of weight also between your illness and your diuretics. Despite the high sugar your A1c was reported not to be up. Need to follow trends as more time passes.  Treatment is as we discussed supportive and to watch for any issues with your diuretics or labs over time. Continued abstinence is key.  recap:  Patient was last seen by them on July 14, 2023 for a posthospital visit.  She was apparently admitted on June 27, 2023 to a local facility for worsening abdominal distention and edema.  She had a history of alcohol usage. Pt at the time was having several glasses at night for years.   Dr Wills is for women having more 2 drinks a day will lead to cirrhosis over time.   Her father apparently passed away in March and she had been drinking heavily until then.  Alcohol stopped since June 25/26 and has not done any counseling.  Admission labs reported a hemoglobin of 12.2 platelet count 102 INR 1.6 bilirubin 4.3 alk phos 203 AST 69 ALT 20 lipase 97 ethanol level 222.  Hepatitis ABC panel was reported negative.  Patient had a MELD score reported to be 17 but do not have a DF score. Severe is  alc hep meld over 21.  But above the 15 cut off for oltx.   CT of the abdomen showed cirrhotic nodular liver as well as portal pretension and diffuse abdominal pelvic ascites.  June 28, 2023 EGD was reported normal.  June 27,011 and 11 L paracentesis was done on June 29 with 7.5 L paracentesis was removed.   July 18 7.5 L and 4th one this friday.  She is learing to be on low salt diet.  She had a tap tomorrow will be 2 weeks post the last one.  Patient was started on Lasix 20 and Aldactone 50 mg a day and was inc since to 40 and 100mg   Weight is down 75 from admit and part fluid and part not.  Still losing some.  The ascites may be better with the dose inc.  She was given lactulose mostly for constipation per their note and not for encephalopathy issues.  When seen on July 14 she was not on any special diet but had lost about 11 pounds more amd was 251 admit since leaving the hospital.  Weight was noted to be 194.4 pounds at the time with a BMI of 34.43.  Abdomen is reported to be distended then.  Local labs from July 14 showed AFP of 4.6.  With a sugar of 115 BUN of 12 creatinine 0.5 sodium 136 potassium 4.7 chloride 103 CO2 of 21 calcium 8.8 albumin diminished at 3.4 bilirubin elevated 7.3 alkaline phosphatase 158 AST of 36 and ALT of 14.  WBC 4.2 hemoglobin 13.3 plate count 94 .4 with neutrophils 2617 and lymphocytes 1205.  Prothrombin time was listed as 14.4 with normal up to 11.5 seconds there.  INR was 1.4.  Df 20.6  Meld 18/19.  Able to review some of the [facility] records directly and saw that on June 28, 2023 Dr. Perkins did the EGD and it was normal with the Z-line at 37 cm and the stomach and duodenum also appeared normal.  The June 27, 2023 admit consult mentions how patient had been complaining about edema for a week and had been using alcohol at least a bottle of wine per day for several years and then had cut back to 3 to 4 glasses a day but then when her father passed in March she was drinking more heavily and came in with the hemoglobin of 12.2 INR 1.6 platelets 102 bilirubin 4.3 alk phos 283 AST 69 ALT 20 lipase 97 and ethanol of 222.  Mention how the CT again has shown the diffuse abdominal pelvic ascites as well as sequela of portal pretension and the cirrhosis.  She never had a colonoscopy.  Previous EGD had been 20 years ago.  Other past medical history listed ADHD, alcohol use, anxiety, diabetes, ascites.  Jaundice.  A1c 4.4 % without meds.  Alcohol usage again was as listed above.  Denied tobacco usage.    CT scan had shown lower thorax with no effusion of the pleural or pericardial area but did a cirrhotic nodular liver with sequela of hypertension including gastroesophageal and splenic varices and moderate to large intra-abdominal ascites.  The fluid appeared simple.  Gallbladder was unremarkable.  No bile duct dilation seen.  Spleen was enlarged.  Splenic mass.  Perisplenic ascites were seen.  No significant bowel wall thickening or evidence of free air was seen.  Atherosclerotic calcifications were seen in the abdominal aorta and branches but without aneurysm.  They felt her MELD score was 17 at the time and they plan for the EGD to be done. July 18, 2023 ultrasound was 7.1 L. June 29, 2020 ultrasound 7.5 L. June 27, 2000 2311 L.  Discharge summary mentions she was admitted from the 27th of the 29th and that she was scheduled to follow-up with Dr. Cruz.  She was a 3m wait so was set up to see u.s.  They had recommended her to be on thiamine and folic acid.    She was kept on her Lasix and Aldactone after her paracentesis treatments.  She was monitored for withdrawal and they were not noted.  She was kept on her citalopram and Adderall for her anxiety/ADHD/depression.  She was reported to have type 2 diabetes but with an A1c of 4.4 and been off of medicines for 2 years.  Discharge medicines include citalopram 1 and half tablets at night, folic acid 100-day, Lasix 20 mgsa day, lactulose 15 mL once a day as needed, Aldactone 50 mg a day and thiamine 100 mg a day.

## 2023-12-20 ENCOUNTER — LAB OUTSIDE AN ENCOUNTER (OUTPATIENT)
Dept: URBAN - METROPOLITAN AREA TELEHEALTH 2 | Facility: TELEHEALTH | Age: 38
End: 2023-12-20

## 2023-12-21 LAB
A/G RATIO: 0.9
ABSOLUTE BASOPHILS: 38
ABSOLUTE EOSINOPHILS: 182
ABSOLUTE LYMPHOCYTES: 1368
ABSOLUTE MONOCYTES: 547
ABSOLUTE NEUTROPHILS: 5464
ALBUMIN: 3.6
ALKALINE PHOSPHATASE: 162
ALT (SGPT): 28
AST (SGOT): 49
BASOPHILS: 0.5
BILIRUBIN, TOTAL: 8.6
BUN/CREATININE RATIO: 41
BUN: 28
CALCIUM: 9.4
CARBON DIOXIDE, TOTAL: 26
CHLORIDE: 95
COMMENT(S): (no result)
CREATININE: 0.68
EGFR: 113
EOSINOPHILS: 2.4
GLOBULIN, TOTAL: 4
GLUCOSE: 134
HEMATOCRIT: 39.3
HEMOGLOBIN: 12.5
INR: 1.3
LYMPHOCYTES: 18
MCH: 32.2
MCHC: 31.8
MCV: 101.3
MONOCYTES: 7.2
MPV: 11.3
NEUTROPHILS: 71.9
PLATELET COUNT: 76
POTASSIUM: 4.5
PROTEIN, TOTAL: 7.6
PT: 13.5
RDW: 12.7
RED BLOOD CELL COUNT: 3.88
SODIUM: 131
WHITE BLOOD CELL COUNT: 7.6

## 2024-01-10 ENCOUNTER — WEB ENCOUNTER (OUTPATIENT)
Dept: URBAN - METROPOLITAN AREA CLINIC 86 | Facility: CLINIC | Age: 39
End: 2024-01-10

## 2024-01-11 ENCOUNTER — TELEPHONE ENCOUNTER (OUTPATIENT)
Dept: URBAN - METROPOLITAN AREA CLINIC 86 | Facility: CLINIC | Age: 39
End: 2024-01-11

## 2024-01-17 ENCOUNTER — LAB OUTSIDE AN ENCOUNTER (OUTPATIENT)
Dept: URBAN - METROPOLITAN AREA TELEHEALTH 2 | Facility: TELEHEALTH | Age: 39
End: 2024-01-17

## 2024-01-19 ENCOUNTER — WEB ENCOUNTER (OUTPATIENT)
Dept: URBAN - METROPOLITAN AREA CLINIC 86 | Facility: CLINIC | Age: 39
End: 2024-01-19

## 2024-01-19 ENCOUNTER — TELEPHONE ENCOUNTER (OUTPATIENT)
Dept: URBAN - METROPOLITAN AREA CLINIC 86 | Facility: CLINIC | Age: 39
End: 2024-01-19

## 2024-01-19 LAB
A/G RATIO: 1
ABSOLUTE BASOPHILS: 32
ABSOLUTE EOSINOPHILS: 180
ABSOLUTE LYMPHOCYTES: 1150
ABSOLUTE MONOCYTES: 292
ABSOLUTE NEUTROPHILS: 3646
ALBUMIN: 3.5
ALKALINE PHOSPHATASE: 167
ALT (SGPT): 48
AST (SGOT): 75
BASOPHILS: 0.6
BILIRUBIN, TOTAL: 6.5
BUN/CREATININE RATIO: 41
BUN: 29
CALCIUM: 9.3
CARBON DIOXIDE, TOTAL: 24
CHLORIDE: 97
COMMENT(S): (no result)
CREATININE: 0.7
EGFR: 112
EOSINOPHILS: 3.4
GLOBULIN, TOTAL: 3.6
GLUCOSE: 139
HEMATOCRIT: 39.9
HEMOGLOBIN: 13
INR: 1.2
LYMPHOCYTES: 21.7
MCH: 32.3
MCHC: 32.6
MCV: 99
MONOCYTES: 5.5
MPV: 10.8
NEUTROPHILS: 68.8
PLATELET COUNT: 79
POTASSIUM: 5.3
PROTEIN, TOTAL: 7.1
PT: 13
RDW: 12.4
RED BLOOD CELL COUNT: 4.03
SODIUM: 131
WHITE BLOOD CELL COUNT: 5.3

## 2024-01-19 NOTE — HPI-TODAY'S VISIT:
Dear Lupe Song,   The 1/18/24 labs were sent to me. The white blood cells 5 3, hemoglobin 13, MCV 99, platelets 79 and this is well.  The glucose 139 which is slightly elevated if you are fasting.  Creatinine 0.7, sodium 131, potassium 5.3, bilirubin 6.5, alkaline phosphatase 167, AST 75, ALT 48. INR 1.2. MELD 16. The ast and alt are up? Any idea why? Any recent illness? New meds? Let me know.  Mary Kay Diamond PA-C

## 2024-01-24 ENCOUNTER — WEB ENCOUNTER (OUTPATIENT)
Dept: URBAN - METROPOLITAN AREA CLINIC 86 | Facility: CLINIC | Age: 39
End: 2024-01-24

## 2024-01-30 ENCOUNTER — OFFICE VISIT (OUTPATIENT)
Dept: URBAN - METROPOLITAN AREA CLINIC 80 | Facility: CLINIC | Age: 39
End: 2024-01-30
Payer: COMMERCIAL

## 2024-01-30 ENCOUNTER — OFFICE VISIT (OUTPATIENT)
Dept: URBAN - METROPOLITAN AREA TELEHEALTH 2 | Facility: TELEHEALTH | Age: 39
End: 2024-01-30

## 2024-01-30 ENCOUNTER — TELEPHONE ENCOUNTER (OUTPATIENT)
Dept: URBAN - METROPOLITAN AREA CLINIC 86 | Facility: CLINIC | Age: 39
End: 2024-01-30

## 2024-01-30 ENCOUNTER — LAB OUTSIDE AN ENCOUNTER (OUTPATIENT)
Dept: URBAN - METROPOLITAN AREA CLINIC 80 | Facility: CLINIC | Age: 39
End: 2024-01-30

## 2024-01-30 VITALS
BODY MASS INDEX: 26.58 KG/M2 | HEART RATE: 101 BPM | WEIGHT: 150 LBS | TEMPERATURE: 97.3 F | HEIGHT: 63 IN | SYSTOLIC BLOOD PRESSURE: 113 MMHG | DIASTOLIC BLOOD PRESSURE: 60 MMHG

## 2024-01-30 DIAGNOSIS — Z12.11 COLON CANCER SCREENING: ICD-10-CM

## 2024-01-30 DIAGNOSIS — Z01.818 PRE-TRANSPLANT EVALUATION FOR LIVER TRANSPLANT: ICD-10-CM

## 2024-01-30 DIAGNOSIS — K70.31 ALCOHOLIC CIRRHOSIS OF LIVER WITH ASCITES: ICD-10-CM

## 2024-01-30 DIAGNOSIS — K58.9 IRRITABLE BOWEL SYNDROME: ICD-10-CM

## 2024-01-30 PROCEDURE — 99213 OFFICE O/P EST LOW 20 MIN: CPT | Performed by: PHYSICIAN ASSISTANT

## 2024-01-30 RX ORDER — RIFAXIMIN 550 MG/1
1 TABLET TABLET ORAL TWICE A DAY
Qty: 180 TABLET | Refills: 0 | Status: ACTIVE | COMMUNITY

## 2024-01-30 RX ORDER — SPIRONOLACTONE 100 MG/1
1 TABLET TABLET, FILM COATED ORAL DAILY
Qty: 30 TABLET | Refills: 1 | Status: ACTIVE | COMMUNITY

## 2024-01-30 RX ORDER — LACTULOSE 10 G/15ML
30ML BY MOUTH THREE TIMES A DAY AS DIRECTED SOLUTION ORAL
Qty: 2365 UNSPECIFIED | Refills: 0 | Status: ACTIVE | COMMUNITY

## 2024-01-30 RX ORDER — CITALOPRAM 20 MG/1
1 TABLET TABLET, FILM COATED ORAL ONCE A DAY
Status: ACTIVE | COMMUNITY

## 2024-01-30 RX ORDER — RIFAXIMIN 550 MG/1
1 TABLET TABLET ORAL TWICE A DAY
Qty: 180 TABLET | Refills: 0

## 2024-01-30 RX ORDER — FOLIC ACID 1 MG/1
1 TABLET TABLET ORAL ONCE A DAY
Status: ACTIVE | COMMUNITY

## 2024-01-30 RX ORDER — POLYETHYLENE GLYCOL 3350, SODIUM SULFATE, SODIUM CHLORIDE, POTASSIUM CHLORIDE, ASCORBIC ACID, SODIUM ASCORBATE 140-9-5.2G
AS DIRECTED KIT ORAL AS DIRECTED
Qty: 1 | Refills: 0 | OUTPATIENT
Start: 2024-01-30 | End: 2024-01-31

## 2024-01-30 RX ORDER — SPIRONOLACTONE 100 MG/1
TAKE ONE TABLET BY MOUTH ONE TIME DAILY TABLET ORAL
Qty: 30 TABLET | Refills: 1 | Status: ACTIVE | COMMUNITY

## 2024-01-30 RX ORDER — FUROSEMIDE 20 MG/1
TAKE ONE TABLET BY MOUTH TWICE A DAY TABLET ORAL
Qty: 60 TABLET | Refills: 1 | Status: ACTIVE | COMMUNITY

## 2024-01-30 RX ORDER — THIAMINE HCL 100 MG
1 TABLET TABLET ORAL ONCE A DAY
Status: ACTIVE | COMMUNITY

## 2024-01-30 NOTE — PHYSICAL EXAM GASTROINTESTINAL
Abdomen,  soft, tender, nondistended,  no guarding or rigidity,  no masses palpable,  normal bowel sounds,  Liver and Spleen,  no hepatomegaly present,  no hepatosplenomegaly,  liver nontender Rectal, deferred

## 2024-01-30 NOTE — HPI-TODAY'S VISIT:
Pt had egd in June last year - wnl she has been having increased bloating and pain - thought it was from her ascites - but there was none when she went for paracentesis this week she is undergoing transplant workup-  needs egd and colon her CEA and CA 19-9 were elevated per the pt  nausea - 2 episodes emesis over the last week no hematemesis normal bowel habit with the lactulose was 5 times a day - last week and half maybe 2 BM a day - also increased the lactulose and not pooping well no BRBPR or melena no family hx colon ca or polyps no etoh since June 25th, 2023

## 2024-01-30 NOTE — HPI-TODAY'S VISIT:
Patient is a 38-year-old female Seen july 2023 at the request of Barb Espinosa nurse practitioner and Dr. Antoine Gomez for evaluation of recently noted cirrhosis suspected due to alcohol.    A copy of the note will be sent to the referring provider.   12/6/23 healow MELD 22 on 11/28/23 labs  she was at the ER this weekend she had complex migraine she saw dr. leo starting oltx  she is now  200 mg aldactone and 80 mg lasix and takign all in am  she had labs done on 12/3/23 at er and stable cr and lytes  she will do day long visit soon to start tolx process\  sh eis still doing paracentesis  She is doing a new counselor this after    RECAP 11/6/23 telemedicine  11/2/23 labs were sent to me.,  Hemoglobin 12.3, MCV 98, platelets 54 and this is low.  Glucose 140, creatinine 0.56, sodium 135, potassium 4.3, bilirubin 7.6, alkaline phosphatase 122, AST 43, ALT 23.  INR 1.4, back up. MELD up at 19, MELD-na 19.   she is using the bathroom more so diuretics may be helping weight 170 and still doing paracentesis every 2 weeks so not supriya to be any different she gains on average 10 lbs between them  still have appt 11/28/23 with dr. leo.    10/26/23 ov SHe has oltx appt on 11/28 she had nathen yesterday and removed 4.6 L Happy to see this.  THis was 9 days after previous, so sooner bc the previous had suction issues so not as much done.  She is shcedule through the end of the year   The 10/23/23 labs were sent to me.  The white blood cells low at 3.5 and we will continue to monitor.  Red blood cells 3.7, hemoglobin 12, .5 and this is elevated be sure you are taking the B12.  Platelets low at 71 and we will continue to monitor.  The glucose 114, creatinine 0.62, sodium 138, potassium 4.3, bilirubin 7.5, alkaline phosphatase 122, AST 42, ALT 24, INR 1.3.  Bilirubin slightly lower which is good to see.  INR down from 1.4 which is also good to see. MELD still up at 17  The 10/11/23 labs were sent to me. The glucose 106 which is elevated if you are fasting. Creatinine 0.7, sodium 137, potassium 4.8, bilirubin 7.9, alkaline phosphatase 116, AST 45, ALT 22. White blood cells low at 3.7, red blood cells 3.5, hemoglobin 12.8, , platelets 66. INR 1.4. The bilirubin slightly lower now as previously it was 8.8. The AST at 45 and we will need to continue to monitor this. MELD 18. Please let us know if you have not been able to get the appointment with [facility] oltx team. We will see what they look like next with with increasing the aldactone in efforts to try to slow need for paracentesis.    recap 10/10/23 ov Dear Lupe Song, Sept 27: Ammonia normal 48 and normal less than 72 umol/L. Would still add the xifaxan as we saw the asterixis clincially and you have the memory lapses and see how you do. Inr 1.3 down from 1.4 from 1.5. Glu 111 elevated from 104. Bun 14 and cr 0.7. Na 134 and k 4.3 and cl 100 and co2 20 and ca 8.5 little low. Albumin 3.3 and little lower and due to the taps and liver disease. Tb 8.8 and down 9.4. Alk 142 and up from 113. Ast 37 and alt 16. Prior ast 35 and alt 16. Wbc 5.3 hg 12 and mcv 100.8 up but little lower. Platelets 70 and down from 81. Neutrophils 3498 and lymphs 1643 normal but monocytes low 106. Meld 18 and meld na 20. Lets see how you do with the diuretics and lets follow the labs and weight trends closely. Called the pt to discuss labs. No side effects from the xifaxan and not seeing a change. Weight 177.2 and 1 pound up from last week. Nothing yet from [place].  She had paracentesis on 10/3/23 had paracentesis and took off 7.5 L.  She is now 166.  wbc 4.8 red blood cells 3.66 mcv 108.7, platelets 84, 13.4,  inr 1.6,  cr. 0.7, ast 43, alt 19, alp 151, tb 7.2 MELD 19 MELD na 23.   she  is taking the aldactone 50 and lasix 20 mg and we will go up on this  Dr Gee Dear Lupe Song, September 1 ultrasound sent in and it showed a 5.9 L tap. It states specifically they did not do any studies on the fluid as apparently they do not have a standing order lab study protocol requested for this. Again this is one of the limitations of the doing the taps elsewhere. September 22 ultrasound also sent in and 4.7 L was removed and again the fluid was discarded as there was no requested analysis and no standing order protocol to do so. Here they do this on the fluid automatically. That explains why we never received any other fluid studies from there. It is possible that the team there could speak with them to get them to do the studies with the next tap. Dr. Gee recap She had been less jaundiced and she says over the last 5 weeks people commented.  3m off alcohol and send over to the oltx,  Doing therapy with private therapist.  Refer to [place] as 3m and not getting better.  Did ct at [facility] and done for pain post tap and that was new and chills and Dr Tolentino and went in and colitis.  They gave her abx and bentyl.   Sept 20 labs show inr little up 1.4 but down from 1.5. Glucose 104 elevated but down from 132. Bun 15 and cr 0.67 and na 135 and k 4.5 and cl 101 and co2 23 and calcium 8.5 and little low and may want to share with primary and look at this and your vitamin d level status. Albumin little lower 3.4 from 3.7 prior. Total bili 9.4 and up from 6.2 and prior 7.3. Alk 113 and ast 35 and alt 16. Prior ast 9 and alt 18. Wbc 3.9 and hg 12.7 and mcv 101.7 elevated. Platelets low 81.  Rbc little low 3.47 and mch little up 36.6. Neutrophils 2543 and lymphs 1045 normal. Monocytes little low 191. Meld 19 and meld na 20. This went up from 17 and concerning to see.   She did tap friday and got 4.5 liters off and says did tap on side and not drain as much.  re diuretics she was on lasix 20mg and aldactone once day.  Can go up to bid on those and do labs next week and the following,  telemed in 2 weeks and see doing.  July 31st labs showed your iron sat was elevated at 55%.  We may need to do a hemochromatosis panel to follow-up on that to see why it is running high.  It could still also be high as well because of the alcohol use previously.  Ceruloplasmin was normal at 32. Ferritin was also again up in keeping with iron being up at 440. Hep B immunity was seen at a level of 105. Your alpha-1 screen was normal at MM type. Sugar was up at 132 and higher from July 15 when it was 115. BUN is 16 creatinine 0.65 sodium 135 potassium 4.6 calcium 8.9 albumin 3.7 normal, bilirubin was 6.2 down from 7.3 (not fractionated and need that done next time) and alk phos was 154 from 158 and AST 39 from 36 and ALT of 18 from 14. WBC was 4.2, hemoglobin 12.4 and platelet count low at 67.  MCV was 105. INR was elevated at 1.5 with a prothrombin time 17.4. No hep A immunity was seen.  You should consider getting the hepatitis A vaccine series electively. Summary: DF 17.2 so less than 32. Meld 18 and meld na 19 so less than 21 which is good to see but would want it to be less than 15 and above that.  Also, as you recall, you had previously in June when back in the hospital had a MELD score that had been less than 21 and a DF score of less than 32.  You had lost a substantial amount of weight also between your illness and your diuretics. Despite the high sugar your A1c was reported not to be up. Need to follow trends as more time passes.  Treatment is as we discussed supportive and to watch for any issues with your diuretics or labs over time. Continued abstinence is key.  recap:  Patient was last seen by them on July 14, 2023 for a posthospital visit.  She was apparently admitted on June 27, 2023 to a local facility for worsening abdominal distention and edema.  She had a history of alcohol usage. Pt at the time was having several glasses at night for years.   Dr Wills is for women having more 2 drinks a day will lead to cirrhosis over time.   Her father apparently passed away in March and she had been drinking heavily until then.  Alcohol stopped since June 25/26 and has not done any counseling.  Admission labs reported a hemoglobin of 12.2 platelet count 102 INR 1.6 bilirubin 4.3 alk phos 203 AST 69 ALT 20 lipase 97 ethanol level 222.  Hepatitis ABC panel was reported negative.  Patient had a MELD score reported to be 17 but do not have a DF score. Severe is  alc hep meld over 21.  But above the 15 cut off for oltx.   CT of the abdomen showed cirrhotic nodular liver as well as portal pretension and diffuse abdominal pelvic ascites.  June 28, 2023 EGD was reported normal.  June 27,011 and 11 L paracentesis was done on June 29 with 7.5 L paracentesis was removed.   July 18 7.5 L and 4th one this friday.  She is learing to be on low salt diet.  She had a tap tomorrow will be 2 weeks post the last one.  Patient was started on Lasix 20 and Aldactone 50 mg a day and was inc since to 40 and 100mg   Weight is down 75 from admit and part fluid and part not.  Still losing some.  The ascites may be better with the dose inc.  She was given lactulose mostly for constipation per their note and not for encephalopathy issues.  When seen on July 14 she was not on any special diet but had lost about 11 pounds more amd was 251 admit since leaving the hospital.  Weight was noted to be 194.4 pounds at the time with a BMI of 34.43.  Abdomen is reported to be distended then.  Local labs from July 14 showed AFP of 4.6.  With a sugar of 115 BUN of 12 creatinine 0.5 sodium 136 potassium 4.7 chloride 103 CO2 of 21 calcium 8.8 albumin diminished at 3.4 bilirubin elevated 7.3 alkaline phosphatase 158 AST of 36 and ALT of 14.  WBC 4.2 hemoglobin 13.3 plate count 94 .4 with neutrophils 2617 and lymphocytes 1205.  Prothrombin time was listed as 14.4 with normal up to 11.5 seconds there.  INR was 1.4.  Df 20.6  Meld 18/19.  Able to review some of the [facility] records directly and saw that on June 28, 2023 Dr. Perkins did the EGD and it was normal with the Z-line at 37 cm and the stomach and duodenum also appeared normal.  The June 27, 2023 admit consult mentions how patient had been complaining about edema for a week and had been using alcohol at least a bottle of wine per day for several years and then had cut back to 3 to 4 glasses a day but then when her father passed in March she was drinking more heavily and came in with the hemoglobin of 12.2 INR 1.6 platelets 102 bilirubin 4.3 alk phos 283 AST 69 ALT 20 lipase 97 and ethanol of 222.  Mention how the CT again has shown the diffuse abdominal pelvic ascites as well as sequela of portal pretension and the cirrhosis.  She never had a colonoscopy.  Previous EGD had been 20 years ago.  Other past medical history listed ADHD, alcohol use, anxiety, diabetes, ascites.  Jaundice.  A1c 4.4 % without meds.  Alcohol usage again was as listed above.  Denied tobacco usage.    CT scan had shown lower thorax with no effusion of the pleural or pericardial area but did a cirrhotic nodular liver with sequela of hypertension including gastroesophageal and splenic varices and moderate to large intra-abdominal ascites.  The fluid appeared simple.  Gallbladder was unremarkable.  No bile duct dilation seen.  Spleen was enlarged.  Splenic mass.  Perisplenic ascites were seen.  No significant bowel wall thickening or evidence of free air was seen.  Atherosclerotic calcifications were seen in the abdominal aorta and branches but without aneurysm.  They felt her MELD score was 17 at the time and they plan for the EGD to be done. July 18, 2023 ultrasound was 7.1 L. June 29, 2020 ultrasound 7.5 L. June 27, 2000 2311 L.  Discharge summary mentions she was admitted from the 27th of the 29th and that she was scheduled to follow-up with Dr. Cruz.  She was a 3m wait so was set up to see u.s.  They had recommended her to be on thiamine and folic acid.    She was kept on her Lasix and Aldactone after her paracentesis treatments.  She was monitored for withdrawal and they were not noted.  She was kept on her citalopram and Adderall for her anxiety/ADHD/depression.  She was reported to have type 2 diabetes but with an A1c of 4.4 and been off of medicines for 2 years.  Discharge medicines include citalopram 1 and half tablets at night, folic acid 100-day, Lasix 20 mgsa day, lactulose 15 mL once a day as needed, Aldactone 50 mg a day and thiamine 100 mg a day.

## 2024-01-31 ENCOUNTER — TELEPHONE ENCOUNTER (OUTPATIENT)
Dept: URBAN - METROPOLITAN AREA CLINIC 86 | Facility: CLINIC | Age: 39
End: 2024-01-31

## 2024-01-31 ENCOUNTER — LAB OUTSIDE AN ENCOUNTER (OUTPATIENT)
Dept: URBAN - METROPOLITAN AREA TELEHEALTH 2 | Facility: TELEHEALTH | Age: 39
End: 2024-01-31

## 2024-01-31 ENCOUNTER — OFFICE VISIT (OUTPATIENT)
Dept: URBAN - METROPOLITAN AREA TELEHEALTH 2 | Facility: TELEHEALTH | Age: 39
End: 2024-01-31
Payer: COMMERCIAL

## 2024-01-31 VITALS — HEIGHT: 63 IN

## 2024-01-31 DIAGNOSIS — K58.8 OTHER IRRITABLE BOWEL SYNDROME: ICD-10-CM

## 2024-01-31 DIAGNOSIS — K70.11 ALCOHOLIC HEPATITIS WITH ASCITES: ICD-10-CM

## 2024-01-31 DIAGNOSIS — K76.82 HEPATIC ENCEPHALOPATHY: ICD-10-CM

## 2024-01-31 DIAGNOSIS — K70.31 ALCOHOLIC CIRRHOSIS OF LIVER WITH ASCITES: ICD-10-CM

## 2024-01-31 PROCEDURE — 99214 OFFICE O/P EST MOD 30 MIN: CPT | Performed by: PHYSICIAN ASSISTANT

## 2024-01-31 RX ORDER — FOLIC ACID 1 MG/1
1 TABLET TABLET ORAL ONCE A DAY
Status: ACTIVE | COMMUNITY

## 2024-01-31 RX ORDER — SPIRONOLACTONE 100 MG/1
TAKE ONE TABLET BY MOUTH ONE TIME DAILY TABLET ORAL
Qty: 30 TABLET | Refills: 1 | Status: ACTIVE | COMMUNITY

## 2024-01-31 RX ORDER — RIFAXIMIN 550 MG/1
1 TABLET TABLET ORAL TWICE A DAY
Qty: 180 TABLET | Refills: 0 | Status: ACTIVE | COMMUNITY

## 2024-01-31 RX ORDER — LACTULOSE 10 G/15ML
30ML BY MOUTH THREE TIMES A DAY AS DIRECTED SOLUTION ORAL
Qty: 2365 UNSPECIFIED | Refills: 0 | Status: ACTIVE | COMMUNITY

## 2024-01-31 RX ORDER — POLYETHYLENE GLYCOL 3350, SODIUM SULFATE, SODIUM CHLORIDE, POTASSIUM CHLORIDE, ASCORBIC ACID, SODIUM ASCORBATE 140-9-5.2G
AS DIRECTED KIT ORAL AS DIRECTED
Qty: 1 | Refills: 0 | Status: ACTIVE | COMMUNITY
Start: 2024-01-30 | End: 2024-01-31

## 2024-01-31 RX ORDER — CITALOPRAM 20 MG/1
1 TABLET TABLET, FILM COATED ORAL ONCE A DAY
Status: ACTIVE | COMMUNITY

## 2024-01-31 RX ORDER — FUROSEMIDE 20 MG/1
TAKE ONE TABLET BY MOUTH TWICE A DAY TABLET ORAL
Qty: 60 TABLET | Refills: 1 | Status: ACTIVE | COMMUNITY

## 2024-01-31 RX ORDER — SPIRONOLACTONE 100 MG/1
1 TABLET TABLET, FILM COATED ORAL DAILY
Qty: 30 TABLET | Refills: 1 | Status: ACTIVE | COMMUNITY

## 2024-01-31 RX ORDER — THIAMINE HCL 100 MG
1 TABLET TABLET ORAL ONCE A DAY
Status: ACTIVE | COMMUNITY

## 2024-01-31 NOTE — HPI-TODAY'S VISIT:
Patient is a 38-year-old female Seen july 2023 at the request of Barb Espinosa nurse practitioner and Dr. Antonie Gomez for evaluation of recently noted cirrhosis suspected due to alcohol.    A copy of the note will be sent to the referring provider.  1/31/24 healwilman. SHe went for paracentesis on 1/24/24 and not enough fluid to do she is still on aldactone 200mg and lasix 80 mg They increased her lactulose to 3 times a day 30 mL a day to help with constipation and this is helping.  SHe pending colonoscpy and they are going to try to work her in . They are going to do a repeat EGD at the same time.   She has a mammogram monday on right breast and us for oltx  She is doing [employer] current with insurance   She did labs monday with [facility] and i cannot see those in epic and will get those  She needs to do the MRI and not done and if labs up need to get this done.   recap 12/6/23 healow MELD 22 on 11/28/23 labs  she was at the ER this weekend she had complex migraine she saw dr. leo starting oltx  she is now  200 mg aldactone and 80 mg lasix and takign all in am  she had labs done on 12/3/23 at er and stable cr and lytes  she will do day long visit soon to start tolx process\  sh eis still doing paracentesis  She is doing a new counselor this after   11/6/23 telemedicine  11/2/23 labs were sent to me.,  Hemoglobin 12.3, MCV 98, platelets 54 and this is low.  Glucose 140, creatinine 0.56, sodium 135, potassium 4.3, bilirubin 7.6, alkaline phosphatase 122, AST 43, ALT 23.  INR 1.4, back up. MELD up at 19, MELD-na 19.   she is using the bathroom more so diuretics may be helping weight 170 and still doing paracentesis every 2 weeks so not supriya to be any different she gains on average 10 lbs between them  still have appt 11/28/23 with dr. leo.    10/26/23 ov SHe has oltx appt on 11/28 she had nathen yesterday and removed 4.6 L Happy to see this.  THis was 9 days after previous, so sooner bc the previous had suction issues so not as much done.  She is shcedule through the end of the year   The 10/23/23 labs were sent to me.  The white blood cells low at 3.5 and we will continue to monitor.  Red blood cells 3.7, hemoglobin 12, .5 and this is elevated be sure you are taking the B12.  Platelets low at 71 and we will continue to monitor.  The glucose 114, creatinine 0.62, sodium 138, potassium 4.3, bilirubin 7.5, alkaline phosphatase 122, AST 42, ALT 24, INR 1.3.  Bilirubin slightly lower which is good to see.  INR down from 1.4 which is also good to see. MELD still up at 17  The 10/11/23 labs were sent to me. The glucose 106 which is elevated if you are fasting. Creatinine 0.7, sodium 137, potassium 4.8, bilirubin 7.9, alkaline phosphatase 116, AST 45, ALT 22. White blood cells low at 3.7, red blood cells 3.5, hemoglobin 12.8, , platelets 66. INR 1.4. The bilirubin slightly lower now as previously it was 8.8. The AST at 45 and we will need to continue to monitor this. MELD 18. Please let us know if you have not been able to get the appointment with [facility] oltx team. We will see what they look like next with with increasing the aldactone in efforts to try to slow need for paracentesis.    recap 10/10/23 ov Dear Lupe Song, Sept 27: Ammonia normal 48 and normal less than 72 umol/L. Would still add the xifaxan as we saw the asterixis clincially and you have the memory lapses and see how you do. Inr 1.3 down from 1.4 from 1.5. Glu 111 elevated from 104. Bun 14 and cr 0.7. Na 134 and k 4.3 and cl 100 and co2 20 and ca 8.5 little low. Albumin 3.3 and little lower and due to the taps and liver disease. Tb 8.8 and down 9.4. Alk 142 and up from 113. Ast 37 and alt 16. Prior ast 35 and alt 16. Wbc 5.3 hg 12 and mcv 100.8 up but little lower. Platelets 70 and down from 81. Neutrophils 3498 and lymphs 1643 normal but monocytes low 106. Meld 18 and meld na 20. Lets see how you do with the diuretics and lets follow the labs and weight trends closely. Called the pt to discuss labs. No side effects from the xifaxan and not seeing a change. Weight 177.2 and 1 pound up from last week. Nothing yet from [facility].  She had paracentesis on 10/3/23 had paracentesis and took off 7.5 L.  She is now 166.  wbc 4.8 red blood cells 3.66 mcv 108.7, platelets 84, 13.4,  inr 1.6,  cr. 0.7, ast 43, alt 19, alp 151, tb 7.2 MELD 19 MELD na 23.   she  is taking the aldactone 50 and lasix 20 mg and we will go up on this  Dr Gee Dear Lupe Song, September 1 ultrasound sent in and it showed a 5.9 L tap. It states specifically they did not do any studies on the fluid as apparently they do not have a standing order lab study protocol requested for this. Again this is one of the limitations of the doing the taps elsewhere. September 22 ultrasound also sent in and 4.7 L was removed and again the fluid was discarded as there was no requested analysis and no standing order protocol to do so. Here they do this on the fluid automatically. That explains why we never received any other fluid studies from there. It is possible that the team there could speak with them to get them to do the studies with the next tap. Dr. Gee recap She had been less jaundiced and she says over the last 5 weeks people commented.  3m off alcohol and send over to the oltx,  Doing therapy with private therapist.  Refer to [facility] as 3m and not getting better.  Did ct at [facility] and done for pain post tap and that was new and chills and Dr Tolentino and went in and colitis.  They gave her abx and bentyl.   Sept 20 labs show inr little up 1.4 but down from 1.5. Glucose 104 elevated but down from 132. Bun 15 and cr 0.67 and na 135 and k 4.5 and cl 101 and co2 23 and calcium 8.5 and little low and may want to share with primary and look at this and your vitamin d level status. Albumin little lower 3.4 from 3.7 prior. Total bili 9.4 and up from 6.2 and prior 7.3. Alk 113 and ast 35 and alt 16. Prior ast 9 and alt 18. Wbc 3.9 and hg 12.7 and mcv 101.7 elevated. Platelets low 81.  Rbc little low 3.47 and mch little up 36.6. Neutrophils 2543 and lymphs 1045 normal. Monocytes little low 191. Meld 19 and meld na 20. This went up from 17 and concerning to see.   She did tap friday and got 4.5 liters off and says did tap on side and not drain as much.  re diuretics she was on lasix 20mg and aldactone once day.  Can go up to bid on those and do labs next week and the following,  telemed in 2 weeks and see doing.  July 31st labs showed your iron sat was elevated at 55%.  We may need to do a hemochromatosis panel to follow-up on that to see why it is running high.  It could still also be high as well because of the alcohol use previously.  Ceruloplasmin was normal at 32. Ferritin was also again up in keeping with iron being up at 440. Hep B immunity was seen at a level of 105. Your alpha-1 screen was normal at MM type. Sugar was up at 132 and higher from July 15 when it was 115. BUN is 16 creatinine 0.65 sodium 135 potassium 4.6 calcium 8.9 albumin 3.7 normal, bilirubin was 6.2 down from 7.3 (not fractionated and need that done next time) and alk phos was 154 from 158 and AST 39 from 36 and ALT of 18 from 14. WBC was 4.2, hemoglobin 12.4 and platelet count low at 67.  MCV was 105. INR was elevated at 1.5 with a prothrombin time 17.4. No hep A immunity was seen.  You should consider getting the hepatitis A vaccine series electively. Summary: DF 17.2 so less than 32. Meld 18 and meld na 19 so less than 21 which is good to see but would want it to be less than 15 and above that.  Also, as you recall, you had previously in June when back in the hospital had a MELD score that had been less than 21 and a DF score of less than 32.  You had lost a substantial amount of weight also between your illness and your diuretics. Despite the high sugar your A1c was reported not to be up. Need to follow trends as more time passes.  Treatment is as we discussed supportive and to watch for any issues with your diuretics or labs over time. Continued abstinence is key.  recap:  Patient was last seen by them on July 14, 2023 for a posthospital visit.  She was apparently admitted on June 27, 2023 to a local facility for worsening abdominal distention and edema.  She had a history of alcohol usage. Pt at the time was having several glasses at night for years.   Dr Wills is for women having more 2 drinks a day will lead to cirrhosis over time.   Her father apparently passed away in March and she had been drinking heavily until then.  Alcohol stopped since June 25/26 and has not done any counseling.  Admission labs reported a hemoglobin of 12.2 platelet count 102 INR 1.6 bilirubin 4.3 alk phos 203 AST 69 ALT 20 lipase 97 ethanol level 222.  Hepatitis ABC panel was reported negative.  Patient had a MELD score reported to be 17 but do not have a DF score. Severe is  alc hep meld over 21.  But above the 15 cut off for oltx.   CT of the abdomen showed cirrhotic nodular liver as well as portal pretension and diffuse abdominal pelvic ascites.  June 28, 2023 EGD was reported normal.  June 27,011 and 11 L paracentesis was done on June 29 with 7.5 L paracentesis was removed.   July 18 7.5 L and 4th one this friday.  She is learing to be on low salt diet.  She had a tap tomorrow will be 2 weeks post the last one.  Patient was started on Lasix 20 and Aldactone 50 mg a day and was inc since to 40 and 100mg   Weight is down 75 from admit and part fluid and part not.  Still losing some.  The ascites may be better with the dose inc.  She was given lactulose mostly for constipation per their note and not for encephalopathy issues.  When seen on July 14 she was not on any special diet but had lost about 11 pounds more amd was 251 admit since leaving the hospital.  Weight was noted to be 194.4 pounds at the time with a BMI of 34.43.  Abdomen is reported to be distended then.  Local labs from July 14 showed AFP of 4.6.  With a sugar of 115 BUN of 12 creatinine 0.5 sodium 136 potassium 4.7 chloride 103 CO2 of 21 calcium 8.8 albumin diminished at 3.4 bilirubin elevated 7.3 alkaline phosphatase 158 AST of 36 and ALT of 14.  WBC 4.2 hemoglobin 13.3 plate count 94 .4 with neutrophils 2617 and lymphocytes 1205.  Prothrombin time was listed as 14.4 with normal up to 11.5 seconds there.  INR was 1.4.  Df 20.6  Meld 18/19.  Able to review some of the [facility] records directly and saw that on June 28, 2023 Dr. Perkins did the EGD and it was normal with the Z-line at 37 cm and the stomach and duodenum also appeared normal.  The June 27, 2023 admit consult mentions how patient had been complaining about edema for a week and had been using alcohol at least a bottle of wine per day for several years and then had cut back to 3 to 4 glasses a day but then when her father passed in March she was drinking more heavily and came in with the hemoglobin of 12.2 INR 1.6 platelets 102 bilirubin 4.3 alk phos 283 AST 69 ALT 20 lipase 97 and ethanol of 222.  Mention how the CT again has shown the diffuse abdominal pelvic ascites as well as sequela of portal pretension and the cirrhosis.  She never had a colonoscopy.  Previous EGD had been 20 years ago.  Other past medical history listed ADHD, alcohol use, anxiety, diabetes, ascites.  Jaundice.  A1c 4.4 % without meds.  Alcohol usage again was as listed above.  Denied tobacco usage.    CT scan had shown lower thorax with no effusion of the pleural or pericardial area but did a cirrhotic nodular liver with sequela of hypertension including gastroesophageal and splenic varices and moderate to large intra-abdominal ascites.  The fluid appeared simple.  Gallbladder was unremarkable.  No bile duct dilation seen.  Spleen was enlarged.  Splenic mass.  Perisplenic ascites were seen.  No significant bowel wall thickening or evidence of free air was seen.  Atherosclerotic calcifications were seen in the abdominal aorta and branches but without aneurysm.  They felt her MELD score was 17 at the time and they plan for the EGD to be done. July 18, 2023 ultrasound was 7.1 L. June 29, 2020 ultrasound 7.5 L. June 27, 2000 2311 L.  Discharge summary mentions she was admitted from the 27th of the 29th and that she was scheduled to follow-up with Dr. Cruz.  She was a 3m wait so was set up to see u.s.  They had recommended her to be on thiamine and folic acid.    She was kept on her Lasix and Aldactone after her paracentesis treatments.  She was monitored for withdrawal and they were not noted.  She was kept on her citalopram and Adderall for her anxiety/ADHD/depression.  She was reported to have type 2 diabetes but with an A1c of 4.4 and been off of medicines for 2 years.  Discharge medicines include citalopram 1 and half tablets at night, folic acid 100-day, Lasix 20 mgsa day, lactulose 15 mL once a day as needed, Aldactone 50 mg a day and thiamine 100 mg a day.

## 2024-02-05 ENCOUNTER — COL/EGD (OUTPATIENT)
Dept: URBAN - METROPOLITAN AREA MEDICAL CENTER 18 | Facility: MEDICAL CENTER | Age: 39
End: 2024-02-05
Payer: COMMERCIAL

## 2024-02-05 DIAGNOSIS — K31.89 ACHYLIA: ICD-10-CM

## 2024-02-05 DIAGNOSIS — I85.10 ESOPH VARICE OTHER DIS: ICD-10-CM

## 2024-02-05 DIAGNOSIS — K29.50 ANTRAL GASTRITIS: ICD-10-CM

## 2024-02-05 DIAGNOSIS — K74.60 ADVANCED CIRRHOSIS: ICD-10-CM

## 2024-02-05 DIAGNOSIS — Z12.11 COLON CANCER SCREENING: ICD-10-CM

## 2024-02-05 PROCEDURE — 45378 DIAGNOSTIC COLONOSCOPY: CPT | Performed by: STUDENT IN AN ORGANIZED HEALTH CARE EDUCATION/TRAINING PROGRAM

## 2024-02-05 PROCEDURE — 43239 EGD BIOPSY SINGLE/MULTIPLE: CPT | Performed by: STUDENT IN AN ORGANIZED HEALTH CARE EDUCATION/TRAINING PROGRAM

## 2024-02-05 RX ORDER — SPIRONOLACTONE 100 MG/1
TAKE ONE TABLET BY MOUTH ONE TIME DAILY TABLET ORAL
Qty: 30 TABLET | Refills: 1 | Status: ACTIVE | COMMUNITY

## 2024-02-05 RX ORDER — LACTULOSE 10 G/15ML
30ML BY MOUTH THREE TIMES A DAY AS DIRECTED SOLUTION ORAL
Qty: 2365 UNSPECIFIED | Refills: 0 | Status: ACTIVE | COMMUNITY

## 2024-02-05 RX ORDER — FOLIC ACID 1 MG/1
1 TABLET TABLET ORAL ONCE A DAY
Status: ACTIVE | COMMUNITY

## 2024-02-05 RX ORDER — FUROSEMIDE 20 MG/1
TAKE ONE TABLET BY MOUTH TWICE A DAY TABLET ORAL
Qty: 60 TABLET | Refills: 1 | Status: ACTIVE | COMMUNITY

## 2024-02-05 RX ORDER — SPIRONOLACTONE 100 MG/1
1 TABLET TABLET, FILM COATED ORAL DAILY
Qty: 30 TABLET | Refills: 1 | Status: ACTIVE | COMMUNITY

## 2024-02-05 RX ORDER — THIAMINE HCL 100 MG
1 TABLET TABLET ORAL ONCE A DAY
Status: ACTIVE | COMMUNITY

## 2024-02-05 RX ORDER — RIFAXIMIN 550 MG/1
1 TABLET TABLET ORAL TWICE A DAY
Qty: 180 TABLET | Refills: 0 | Status: ACTIVE | COMMUNITY

## 2024-02-05 RX ORDER — CITALOPRAM 20 MG/1
1 TABLET TABLET, FILM COATED ORAL ONCE A DAY
Status: ACTIVE | COMMUNITY

## 2024-02-09 ENCOUNTER — LAB (OUTPATIENT)
Dept: URBAN - METROPOLITAN AREA CLINIC 80 | Facility: CLINIC | Age: 39
End: 2024-02-09

## 2024-02-13 ENCOUNTER — OV NP (OUTPATIENT)
Dept: URBAN - METROPOLITAN AREA CLINIC 92 | Facility: CLINIC | Age: 39
End: 2024-02-13

## 2024-02-25 ENCOUNTER — LAB (OUTPATIENT)
Dept: URBAN - METROPOLITAN AREA MEDICAL CENTER 18 | Facility: MEDICAL CENTER | Age: 39
End: 2024-02-25
Payer: COMMERCIAL

## 2024-02-25 DIAGNOSIS — D62 ABLA (ACUTE BLOOD LOSS ANEMIA): ICD-10-CM

## 2024-02-25 DIAGNOSIS — K92.1 MELENA: ICD-10-CM

## 2024-02-25 DIAGNOSIS — K70.31 ALCOHOLIC CIRRHOSIS OF LIVER WITH ASCITES: ICD-10-CM

## 2024-02-25 PROCEDURE — 99221 1ST HOSP IP/OBS SF/LOW 40: CPT | Performed by: INTERNAL MEDICINE

## 2024-02-25 PROCEDURE — 99253 IP/OBS CNSLTJ NEW/EST LOW 45: CPT | Performed by: INTERNAL MEDICINE

## 2024-02-25 PROCEDURE — G8427 DOCREV CUR MEDS BY ELIG CLIN: HCPCS | Performed by: INTERNAL MEDICINE

## 2024-02-26 ENCOUNTER — LAB (OUTPATIENT)
Dept: URBAN - METROPOLITAN AREA MEDICAL CENTER 18 | Facility: MEDICAL CENTER | Age: 39
End: 2024-02-26
Payer: COMMERCIAL

## 2024-02-26 DIAGNOSIS — K74.69 OTHER CIRRHOSIS OF LIVER: ICD-10-CM

## 2024-02-26 PROCEDURE — 43235 EGD DIAGNOSTIC BRUSH WASH: CPT | Performed by: INTERNAL MEDICINE

## 2024-02-27 ENCOUNTER — LAB (OUTPATIENT)
Dept: URBAN - METROPOLITAN AREA MEDICAL CENTER 18 | Facility: MEDICAL CENTER | Age: 39
End: 2024-02-27
Payer: COMMERCIAL

## 2024-02-27 DIAGNOSIS — K74.69 OTHER CIRRHOSIS OF LIVER: ICD-10-CM

## 2024-02-27 PROCEDURE — 99231 SBSQ HOSP IP/OBS SF/LOW 25: CPT | Performed by: INTERNAL MEDICINE

## 2024-03-04 ENCOUNTER — EUS (OUTPATIENT)
Dept: URBAN - METROPOLITAN AREA MEDICAL CENTER 18 | Facility: MEDICAL CENTER | Age: 39
End: 2024-03-04
Payer: COMMERCIAL

## 2024-03-04 DIAGNOSIS — K31.89 OTHER DISEASES OF STOMACH AND DUODENUM: ICD-10-CM

## 2024-03-04 DIAGNOSIS — K86.89 OTHER SPECIFIED DISEASES OF PANCREAS: ICD-10-CM

## 2024-03-04 DIAGNOSIS — K76.6 GASTROPATHY, PORTAL HYPERTENSIVE: ICD-10-CM

## 2024-03-04 DIAGNOSIS — R93.3 ABN FINDINGS-GI TRACT: ICD-10-CM

## 2024-03-04 PROCEDURE — 43259 EGD US EXAM DUODENUM/JEJUNUM: CPT | Performed by: STUDENT IN AN ORGANIZED HEALTH CARE EDUCATION/TRAINING PROGRAM

## 2024-03-04 RX ORDER — FUROSEMIDE 20 MG/1
TAKE ONE TABLET BY MOUTH TWICE A DAY TABLET ORAL
Qty: 60 TABLET | Refills: 1 | Status: ACTIVE | COMMUNITY

## 2024-03-04 RX ORDER — LACTULOSE 10 G/15ML
30ML BY MOUTH THREE TIMES A DAY AS DIRECTED SOLUTION ORAL
Qty: 2365 UNSPECIFIED | Refills: 0 | Status: ACTIVE | COMMUNITY

## 2024-03-04 RX ORDER — THIAMINE HCL 100 MG
1 TABLET TABLET ORAL ONCE A DAY
Status: ACTIVE | COMMUNITY

## 2024-03-04 RX ORDER — SPIRONOLACTONE 100 MG/1
1 TABLET TABLET, FILM COATED ORAL DAILY
Qty: 30 TABLET | Refills: 1 | Status: ACTIVE | COMMUNITY

## 2024-03-04 RX ORDER — CITALOPRAM 20 MG/1
1 TABLET TABLET, FILM COATED ORAL ONCE A DAY
Status: ACTIVE | COMMUNITY

## 2024-03-04 RX ORDER — FOLIC ACID 1 MG/1
1 TABLET TABLET ORAL ONCE A DAY
Status: ACTIVE | COMMUNITY

## 2024-03-04 RX ORDER — RIFAXIMIN 550 MG/1
1 TABLET TABLET ORAL TWICE A DAY
Qty: 180 TABLET | Refills: 0 | Status: ACTIVE | COMMUNITY

## 2024-03-04 RX ORDER — SPIRONOLACTONE 100 MG/1
TAKE ONE TABLET BY MOUTH ONE TIME DAILY TABLET ORAL
Qty: 30 TABLET | Refills: 1 | Status: ACTIVE | COMMUNITY

## 2024-03-11 ENCOUNTER — EUS (OUTPATIENT)
Dept: URBAN - METROPOLITAN AREA MEDICAL CENTER 28 | Facility: MEDICAL CENTER | Age: 39
End: 2024-03-11

## 2024-03-27 ENCOUNTER — TELEP (OUTPATIENT)
Dept: URBAN - METROPOLITAN AREA TELEHEALTH 2 | Facility: TELEHEALTH | Age: 39
End: 2024-03-27
Payer: COMMERCIAL

## 2024-03-27 VITALS — BODY MASS INDEX: 25.87 KG/M2 | HEIGHT: 63 IN | WEIGHT: 146 LBS

## 2024-03-27 DIAGNOSIS — Z71.85 VACCINE COUNSELING: ICD-10-CM

## 2024-03-27 DIAGNOSIS — D49.0 IPMN (INTRADUCTAL PAPILLARY MUCINOUS NEOPLASM): ICD-10-CM

## 2024-03-27 DIAGNOSIS — Z79.899 HIGH RISK MEDICATION USE: ICD-10-CM

## 2024-03-27 DIAGNOSIS — K70.31 ALCOHOLIC CIRRHOSIS OF LIVER WITH ASCITES: ICD-10-CM

## 2024-03-27 DIAGNOSIS — K92.2 GI BLEED: ICD-10-CM

## 2024-03-27 DIAGNOSIS — F10.91 ALCOHOL USE DISORDER IN REMISSION: ICD-10-CM

## 2024-03-27 DIAGNOSIS — K76.82 HEPATIC ENCEPHALOPATHY: ICD-10-CM

## 2024-03-27 DIAGNOSIS — E11.9 DIABETES: ICD-10-CM

## 2024-03-27 DIAGNOSIS — K70.11 ALCOHOLIC HEPATITIS WITH ASCITES: ICD-10-CM

## 2024-03-27 DIAGNOSIS — F41.9 ANXIETY AND DEPRESSION: ICD-10-CM

## 2024-03-27 DIAGNOSIS — K58.9 IRRITABLE BOWEL SYNDROME: ICD-10-CM

## 2024-03-27 DIAGNOSIS — F90.9 ADHD: ICD-10-CM

## 2024-03-27 PROBLEM — 92264007: Status: ACTIVE | Noted: 2024-03-27

## 2024-03-27 PROCEDURE — 99214 OFFICE O/P EST MOD 30 MIN: CPT

## 2024-03-27 RX ORDER — LACTULOSE 10 G/15ML
30ML BY MOUTH THREE TIMES A DAY AS DIRECTED SOLUTION ORAL
Qty: 2365 UNSPECIFIED | Refills: 0 | Status: ACTIVE | COMMUNITY

## 2024-03-27 RX ORDER — CITALOPRAM 20 MG/1
1 TABLET TABLET, FILM COATED ORAL ONCE A DAY
Status: ACTIVE | COMMUNITY

## 2024-03-27 RX ORDER — RIFAXIMIN 550 MG/1
1 TABLET TABLET ORAL TWICE A DAY
Qty: 180 TABLET | Refills: 0 | Status: ACTIVE | COMMUNITY
End: 2024-06-10

## 2024-03-27 RX ORDER — FUROSEMIDE 80 MG/1
1 TABLET TABLET ORAL ONCE A DAY
Qty: 30 TABLET | Refills: 1 | Status: ACTIVE | COMMUNITY

## 2024-03-27 RX ORDER — FOLIC ACID 1 MG/1
1 TABLET TABLET ORAL ONCE A DAY
Status: ACTIVE | COMMUNITY

## 2024-03-27 RX ORDER — SPIRONOLACTONE 100 MG/1
2 TABLET TABLET, FILM COATED ORAL DAILY
Qty: 60 TABLET | Refills: 1 | Status: ACTIVE | COMMUNITY

## 2024-03-27 RX ORDER — THIAMINE HCL 100 MG
1 TABLET TABLET ORAL ONCE A DAY
Status: ACTIVE | COMMUNITY

## 2024-03-27 NOTE — HPI-TODAY'S VISIT:
Patient is a 39-year-old female seen Jan 2024 at the referral from Barb Espinosa nurse practitioner and Dr. Antoine Gomez for evaluation of recently noted cirrhosis suspected due to prior alcohol.  A copy of the note will be sent to the referring provider.  Pt with some bleeding in Feb and scopes and neg and given and ppi. She is monitoring for this.  3/20/24 labs : The glucose 167, this is elevated if you are fasting. Creatinine 0.6, sodium low at 132, calcium slightly low at 8.5, lower limits of normal is 8.6. Albumin 3.3 and this is on follow-up. Bilirubin 7.4, alkaline phosphatase 140, AST 39, ALT 24. The white blood cells 5.2, hemoglobin 13.1, .4 which is elevated need to be sure you are taking your vitamin B12 and folic acid. Platelets 83 and this is low and continue to monitor. INR 1.2. MELD 15, MELD na 19 so lower and continue to monitor and review at the follow visit.  She has been seen and saw them Feb and april 19 at [facility].  Waiting on 6m counseling to finish in May.  3/11/24 MRI was sent to me. The liver without fat or iron. They did see cirrhosis but did not see any focal lesions. The gallbladder/biliary tree with gallstones but they did not see any inflammation. Spleen enlarged at 17 cm. The pancreas with cysts that they thought were most consistent with intraductal papillary mucinous neoplasms or IPMNs and these are benign. Largest was measuring up to 10 mm, we will review this at your follow-up visit and see if we can compare this to any prior imaging to see if we need to take a closer look.  They did not see any ductal dilatation. The adrenal glands, kidneys, gastrointestinal, lymph nodes all normal. They noted the hepatic vasculature was patent. They noted varices. Moderate ascites was seen. And they noted a large umbilical hernia containing some fat and ascites as well. Overall they saw cirrhosis with portal hypertension but they did not see any hepatocellular carcinoma. They also saw pancreatic cystic lesions which they thought could be IPMN's. Will review this at your follow-up.  As for ascites, tapped 3 weeks ago and that was first time in  and doing March 28 and not as much as going on vacation.  Weight now 146 and post tap drops to 140 or so.   1/31/24 healow. SHe went for paracentesis on 1/24/24 and not enough fluid to do She is still on aldactone 200mg and lasix 80 mg They increased her lactulose to 3 times a day 30 mL a day to help with constipation and this is helping.  She pending colonoscpy and they are going to try to work her in . They are going to do a repeat EGD at the same time.   She has a mammogram monday on right breast and us for oltx and did and was ok and not an issue. She is doing SARP current with insurance     12/6/23 healow MELD 22 on 11/28/23 labs  she was at the ER this weekend she had complex migraine she saw dr. leo starting oltx  she is now  200 mg aldactone and 80 mg lasix and takign all in am  she had labs done on 12/3/23 at er and stable cr and lytes  she will do day long visit soon to start tolx process  sh eis still doing paracentesis  She is doing a new counselor this after   11/6/23 telemedicine  11/2/23 labs were sent to me., Hemoglobin 12.3, MCV 98, platelets 54 and this is low. Glucose 140, creatinine 0.56, sodium 135, potassium 4.3, bilirubin 7.6, alkaline phosphatase 122, AST 43, ALT 23. INR 1.4, back up. MELD up at 19, MELD-na 19.   she is using the bathroom more so diuretics may be helping weight 170 and still doing paracentesis every 2 weeks so not supriya to be any different she gains on average 10 lbs between them  still have appt 11/28/23 with dr. leo.    10/26/23 ov SHe has oltx appt on 11/28 she had nathen yesterday and removed 4.6 L Happy to see this.  THis was 9 days after previous, so sooner bc the previous had suction issues so not as much done.  She is shcedule through the end of the year   The 10/23/23 labs were sent to me. The white blood cells low at 3.5 and we will continue to monitor. Red blood cells 3.7, hemoglobin 12, .5 and this is elevated be sure you are taking the B12. Platelets low at 71 and we will continue to monitor. The glucose 114, creatinine 0.62, sodium 138, potassium 4.3, bilirubin 7.5, alkaline phosphatase 122, AST 42, ALT 24, INR 1.3. Bilirubin slightly lower which is good to see. INR down from 1.4 which is also good to see. MELD still up at 17  The 10/11/23 labs were sent to me. The glucose 106 which is elevated if you are fasting. Creatinine 0.7, sodium 137, potassium 4.8, bilirubin 7.9, alkaline phosphatase 116, AST 45, ALT 22. White blood cells low at 3.7, red blood cells 3.5, hemoglobin 12.8, , platelets 66. INR 1.4. The bilirubin slightly lower now as previously it was 8.8. The AST at 45 and we will need to continue to monitor this. MELD 18. Please let us know if you have not been able to get the appointment with [facility] oltx team. We will see what they look like next with with increasing the aldactone in efforts to try to slow need for paracentesis.    recap 10/10/23 ov Dear Lupe Song, Sept 27: Ammonia normal 48 and normal less than 72 umol/L. Would still add the xifaxan as we saw the asterixis clincially and you have the memory lapses and see how you do. Inr 1.3 down from 1.4 from 1.5. Glu 111 elevated from 104. Bun 14 and cr 0.7. Na 134 and k 4.3 and cl 100 and co2 20 and ca 8.5 little low. Albumin 3.3 and little lower and due to the taps and liver disease. Tb 8.8 and down 9.4. Alk 142 and up from 113. Ast 37 and alt 16. Prior ast 35 and alt 16. Wbc 5.3 hg 12 and mcv 100.8 up but little lower. Platelets 70 and down from 81. Neutrophils 3498 and lymphs 1643 normal but monocytes low 106. Meld 18 and meld na 20. Lets see how you do with the diuretics and lets follow the labs and weight trends closely. Called the pt to discuss labs. No side effects from the xifaxan and not seeing a change. Weight 177.2 and 1 pound up from last week. Nothing yet from [facility].  She had paracentesis on 10/3/23 had paracentesis and took off 7.5 L.  She is now 166.  wbc 4.8 red blood cells 3.66 mcv 108.7, platelets 84, 13.4,  inr 1.6,  cr. 0.7, ast 43, alt 19, alp 151, tb 7.2 MELD 19 MELD na 23.   she  is taking the aldactone 50 and lasix 20 mg and we will go up on this  Dr Gee Dear Lupe Song, September 1 ultrasound sent in and it showed a 5.9 L tap. It states specifically they did not do any studies on the fluid as apparently they do not have a standing order lab study protocol requested for this. Again this is one of the limitations of the doing the taps elsewhere. September 22 ultrasound also sent in and 4.7 L was removed and again the fluid was discarded as there was no requested analysis and no standing order protocol to do so. Here they do this on the fluid automatically. That explains why we never received any other fluid studies from there. It is possible that the team there could speak with them to get them to do the studies with the next tap. Dr. Gee recap She had been less jaundiced and she says over the last 5 weeks people commented.  3m off alcohol and send over to the oltx,  Doing therapy with private therapist.  Refer to [facility] as  and not getting better.  Did ct at [facility] and done for pain post tap and that was new and chills and Dr Tolentino and went in and colitis.  They gave her abx and bentyl.   Sept 20 labs show inr little up 1.4 but down from 1.5. Glucose 104 elevated but down from 132. Bun 15 and cr 0.67 and na 135 and k 4.5 and cl 101 and co2 23 and calcium 8.5 and little low and may want to share with primary and look at this and your vitamin d level status. Albumin little lower 3.4 from 3.7 prior. Total bili 9.4 and up from 6.2 and prior 7.3. Alk 113 and ast 35 and alt 16. Prior ast 9 and alt 18. Wbc 3.9 and hg 12.7 and mcv 101.7 elevated. Platelets low 81. Rbc little low 3.47 and mch little up 36.6. Neutrophils 2543 and lymphs 1045 normal. Monocytes little low 191. Meld 19 and meld na 20. This went up from 17 and concerning to see.  She did tap friday and got 4.5 liters off and says did tap on side and not drain as much.  re diuretics she was on lasix 20mg and aldactone once day.  Can go up to bid on those and do labs next week and the following,  telemed in 2 weeks and see doing.  July 31st labs showed your iron sat was elevated at 55%. We may need to do a hemochromatosis panel to follow-up on that to see why it is running high. It could still also be high as well because of the alcohol use previously. Ceruloplasmin was normal at 32. Ferritin was also again up in keeping with iron being up at 440. Hep B immunity was seen at a level of 105. Your alpha-1 screen was normal at MM type. Sugar was up at 132 and higher from July 15 when it was 115. BUN is 16 creatinine 0.65 sodium 135 potassium 4.6 calcium 8.9 albumin 3.7 normal, bilirubin was 6.2 down from 7.3 (not fractionated and need that done next time) and alk phos was 154 from 158 and AST 39 from 36 and ALT of 18 from 14. WBC was 4.2, hemoglobin 12.4 and platelet count low at 67. MCV was 105. INR was elevated at 1.5 with a prothrombin time 17.4. No hep A immunity was seen. You should consider getting the hepatitis A vaccine series electively. Summary: DF 17.2 so less than 32. Meld 18 and meld na 19 so less than 21 which is good to see but would want it to be less than 15 and above that.  Also, as you recall, you had previously in June when back in the hospital had a MELD score that had been less than 21 and a DF score of less than 32. You had lost a substantial amount of weight also between your illness and your diuretics. Despite the high sugar your A1c was reported not to be up. Need to follow trends as more time passes. Treatment is as we discussed supportive and to watch for any issues with your diuretics or labs over time. Continued abstinence is key.  recap:  Patient was last seen by them on July 14, 2023 for a posthospital visit.  She was apparently admitted on June 27, 2023 to a local facility for worsening abdominal distention and edema.  She had a history of alcohol usage. Pt at the time was having several glasses at night for years.  Dr Wills is for women having more 2 drinks a day will lead to cirrhosis over time.  Her father apparently passed away in March and she had been drinking heavily until then.  Alcohol stopped since June 25/26 and has not done any counseling.  Admission labs reported a hemoglobin of 12.2 platelet count 102 INR 1.6 bilirubin 4.3 alk phos 203 AST 69 ALT 20 lipase 97 ethanol level 222. Hepatitis ABC panel was reported negative.  Patient had a MELD score reported to be 17 but do not have a DF score. Severe is alc hep meld over 21. But above the 15 cut off for oltx.  CT of the abdomen showed cirrhotic nodular liver as well as portal pretension and diffuse abdominal pelvic ascites.  June 28, 2023 EGD was reported normal.  June 27,011 and 11 L paracentesis was done on June 29 with 7.5 L paracentesis was removed.  July 18 7.5 L and 4th one this friday.  She is learing to be on low salt diet.  She had a tap tomorrow will be 2 weeks post the last one.  Patient was started on Lasix 20 and Aldactone 50 mg a day and was inc since to 40 and 100mg  Weight is down 75 from admit and part fluid and part not.  Still losing some.  The ascites may be better with the dose inc.  She was given lactulose mostly for constipation per their note and not for encephalopathy issues.  When seen on July 14 she was not on any special diet but had lost about 11 pounds more amd was 251 admit since leaving the hospital.  Weight was noted to be 194.4 pounds at the time with a BMI of 34.43.  Abdomen is reported to be distended then.  Local labs from July 14 showed AFP of 4.6. With a sugar of 115 BUN of 12 creatinine 0.5 sodium 136 potassium 4.7 chloride 103 CO2 of 21 calcium 8.8 albumin diminished at 3.4 bilirubin elevated 7.3 alkaline phosphatase 158 AST of 36 and ALT of 14. WBC 4.2 hemoglobin 13.3 plate count 94 .4 with neutrophils 2617 and lymphocytes 1205. Prothrombin time was listed as 14.4 with normal up to 11.5 seconds there. INR was 1.4.  Df 20.6  Meld 18/19.  Able to review some of the [facility] records directly and saw that on June 28, 2023 Dr. Perkins did the EGD and it was normal with the Z-line at 37 cm and the stomach and duodenum also appeared normal.  The June 27, 2023 admit consult mentions how patient had been complaining about edema for a week and had been using alcohol at least a bottle of wine per day for several years and then had cut back to 3 to 4 glasses a day but then when her father passed in March she was drinking more heavily and came in with the hemoglobin of 12.2 INR 1.6 platelets 102 bilirubin 4.3 alk phos 283 AST 69 ALT 20 lipase 97 and ethanol of 222.  Mention how the CT again has shown the diffuse abdominal pelvic ascites as well as sequela of portal pretension and the cirrhosis.  She never had a colonoscopy. Previous EGD had been 20 years ago.  Other past medical history listed ADHD, alcohol use, anxiety, diabetes, ascites. Jaundice.  A1c 4.4 % without meds.  Alcohol usage again was as listed above. Denied tobacco usage.  CT scan had shown lower thorax with no effusion of the pleural or pericardial area but did a cirrhotic nodular liver with sequela of hypertension including gastroesophageal and splenic varices and moderate to large intra-abdominal ascites. The fluid appeared simple. Gallbladder was unremarkable. No bile duct dilation seen. Spleen was enlarged. Splenic mass. Perisplenic ascites were seen. No significant bowel wall thickening or evidence of free air was seen. Atherosclerotic calcifications were seen in the abdominal aorta and branches but without aneurysm. They felt her MELD score was 17 at the time and they plan for the EGD to be done. July 18, 2023 ultrasound was 7.1 L. June 29, 2020 ultrasound 7.5 L. June 27, 2000 2311 L.  Discharge summary mentions she was admitted from the 27th of the 29th and that she was scheduled to follow-up with Dr. Cruz.  She was a 3m wait so was set up to see u.s.  They had recommended her to be on thiamine and folic acid.  She was kept on her Lasix and Aldactone after her paracentesis treatments. She was monitored for withdrawal and they were not noted. She was kept on her citalopram and Adderall for her anxiety/ADHD/depression.  She was reported to have type 2 diabetes but with an A1c of 4.4 and been off of medicines for 2 years.  Discharge medicines include citalopram 1 and half tablets at night, folic acid 100-day, Lasix 20 mg a day, lactulose 15 mL once a day as needed, Aldactone 50 mg a day and thiamine 100 mg a day.   Plan: 1. Plan is continue tap prn as maxed out on the diuretics and slowed down the taps a lot. 2. Almost ready for listing in May. 3. Pt will do labs in 6 and 12 weeks. 4. Telemed then. 5. Mri sept to be done.   Duration of visit was 30 minutes with 10 minutes spent prepping chart and loading info for the visit to [facility] records and then 20 additional minutes spent for this [facility] telemed visit reviewing chart and events and plans with the pt.

## 2024-03-27 NOTE — EXAM-PHYSICAL EXAM
Gen: no distress.   Eyes: some jaundice.   Mouth: no thrush.   CV: no chest pain.   Resp: no wheezes.   Abd: no pain. Some ascites.   Ext: no edema.               Neuro: no weakness.

## 2024-04-11 ENCOUNTER — HEP (OUTPATIENT)
Dept: URBAN - METROPOLITAN AREA CLINIC 85 | Facility: CLINIC | Age: 39
End: 2024-04-11
Payer: COMMERCIAL

## 2024-04-11 DIAGNOSIS — Z23 ENCOUNTER FOR IMMUNIZATION: ICD-10-CM

## 2024-04-11 PROCEDURE — 90632 HEPA VACCINE ADULT IM: CPT

## 2024-04-11 PROCEDURE — 90471 IMMUNIZATION ADMIN: CPT

## 2024-04-11 RX ORDER — CITALOPRAM 20 MG/1
1 TABLET TABLET, FILM COATED ORAL ONCE A DAY
Status: ACTIVE | COMMUNITY

## 2024-04-11 RX ORDER — FOLIC ACID 1 MG/1
1 TABLET TABLET ORAL ONCE A DAY
Status: ACTIVE | COMMUNITY

## 2024-04-11 RX ORDER — RIFAXIMIN 550 MG/1
1 TABLET TABLET ORAL TWICE A DAY
Qty: 180 TABLET | Refills: 0 | Status: ACTIVE | COMMUNITY
End: 2024-06-10

## 2024-04-11 RX ORDER — LACTULOSE 10 G/15ML
30ML BY MOUTH THREE TIMES A DAY AS DIRECTED SOLUTION ORAL
Qty: 2365 UNSPECIFIED | Refills: 0 | Status: ACTIVE | COMMUNITY

## 2024-04-11 RX ORDER — SPIRONOLACTONE 100 MG/1
2 TABLET TABLET, FILM COATED ORAL DAILY
Qty: 60 TABLET | Refills: 1 | Status: ACTIVE | COMMUNITY

## 2024-04-11 RX ORDER — FUROSEMIDE 80 MG/1
1 TABLET TABLET ORAL ONCE A DAY
Qty: 30 TABLET | Refills: 1 | Status: ACTIVE | COMMUNITY

## 2024-04-11 RX ORDER — THIAMINE HCL 100 MG
1 TABLET TABLET ORAL ONCE A DAY
Status: ACTIVE | COMMUNITY

## 2024-05-06 ENCOUNTER — LAB OUTSIDE AN ENCOUNTER (OUTPATIENT)
Dept: URBAN - METROPOLITAN AREA TELEHEALTH 2 | Facility: TELEHEALTH | Age: 39
End: 2024-05-06

## 2024-05-08 ENCOUNTER — TELEPHONE ENCOUNTER (OUTPATIENT)
Dept: URBAN - METROPOLITAN AREA CLINIC 86 | Facility: CLINIC | Age: 39
End: 2024-05-08

## 2024-05-08 LAB
A/G RATIO: 1.2
ALBUMIN: 4.1
ALKALINE PHOSPHATASE: 127
ALT (SGPT): 33
AST (SGOT): 54
BILIRUBIN, TOTAL: 6.3
BUN/CREATININE RATIO: 34
BUN: 37
CALCIUM: 9.6
CARBON DIOXIDE, TOTAL: 24
CHLORIDE: 96
CREATININE: 1.08
EGFR: 66
GLOBULIN, TOTAL: 3.5
GLUCOSE: 159
POTASSIUM: 5.1
PROTEIN, TOTAL: 7.6
SODIUM: 129

## 2024-06-13 ENCOUNTER — DASHBOARD ENCOUNTERS (OUTPATIENT)
Age: 39
End: 2024-06-13

## 2024-06-13 ENCOUNTER — OFFICE VISIT (OUTPATIENT)
Dept: URBAN - METROPOLITAN AREA TELEHEALTH 2 | Facility: TELEHEALTH | Age: 39
End: 2024-06-13
Payer: COMMERCIAL

## 2024-06-13 VITALS — BODY MASS INDEX: 26.4 KG/M2 | HEIGHT: 63 IN | WEIGHT: 149 LBS

## 2024-06-13 DIAGNOSIS — K70.11 ALCOHOLIC HEPATITIS WITH ASCITES: ICD-10-CM

## 2024-06-13 DIAGNOSIS — Z79.899 HIGH RISK MEDICATION USE: ICD-10-CM

## 2024-06-13 DIAGNOSIS — K76.82 HEPATIC ENCEPHALOPATHY: ICD-10-CM

## 2024-06-13 DIAGNOSIS — K70.31 ALCOHOLIC CIRRHOSIS OF LIVER WITH ASCITES: ICD-10-CM

## 2024-06-13 PROCEDURE — 99215 OFFICE O/P EST HI 40 MIN: CPT

## 2024-06-13 RX ORDER — FOLIC ACID 1 MG/1
1 TABLET TABLET ORAL ONCE A DAY
Status: ACTIVE | COMMUNITY

## 2024-06-13 RX ORDER — RIFAXIMIN 550 MG/1
1 TABLET TABLET ORAL TWICE A DAY
Qty: 180 TABLET | Refills: 0 | Status: ACTIVE | COMMUNITY
End: 2024-07-25

## 2024-06-13 RX ORDER — THIAMINE HCL 100 MG
1 TABLET TABLET ORAL ONCE A DAY
Status: ACTIVE | COMMUNITY

## 2024-06-13 RX ORDER — FUROSEMIDE 80 MG/1
1 TABLET TABLET ORAL ONCE A DAY
Qty: 30 TABLET | Refills: 1 | Status: ACTIVE | COMMUNITY

## 2024-06-13 RX ORDER — SPIRONOLACTONE 100 MG/1
2 TABLETS IN AM AND 1 IN PM TABLET, FILM COATED ORAL TWICE A DAY
Refills: 1 | Status: ACTIVE | COMMUNITY

## 2024-06-13 RX ORDER — LACTULOSE 10 G/15ML
30ML BY MOUTH THREE TIMES A DAY AS DIRECTED SOLUTION ORAL
Qty: 2365 UNSPECIFIED | Refills: 0 | Status: ACTIVE | COMMUNITY

## 2024-06-13 RX ORDER — CITALOPRAM 20 MG/1
1 TABLET TABLET, FILM COATED ORAL ONCE A DAY
Status: ACTIVE | COMMUNITY

## 2024-06-13 RX ORDER — FUROSEMIDE 40 MG/1
2 PO IN AM AND 1 IN PM TABLET ORAL TWICE A DAY
Status: ACTIVE | COMMUNITY

## 2024-06-13 NOTE — HPI-TODAY'S VISIT:
Patient is a 39-year-old female seen March 2024 at the referral from Barb Espinosa nurse practitioner and Dr. Antoine Gomez for evaluation of recently noted cirrhosis suspected due to prior alcohol.  A copy of the note will be sent to the referring provider.  Pt is listed at [facility] and Dr Andres and listed and she had thoracentesis tuesday last week and reaccumulated and needing another tap. She spoke with her and referred for TIPS.  Meld score is running 19 and they may try I suspect to put in a small tips.  She says tb down to 3s  For diuretics aldactone 200mg and lasix 80mh and then ald 100mg and 40mg lasix.  May 7 labs showed glucose still elevated at 159 but down from 167 in March.  BUN up a little at 37 and creatinine of 1.08 from previous BUN of 24 and creatinine 0.86.  Unclear if you are running a little dry this day. Sodium 129 a little bit lower and potassium upper normal at 5.1.  Previously sodium 132 and potassium 4.1. Chloride 96 is a little low.  CO2 of 24.  Albumin normal at 4.1 and that could be from post albumin infusions potentially or if you are running a little dry?  Bilirubin up to 6.3, alk phos 127 AST 54 and ALT 33 previously bilirubin 5.4 alk phos 140 AST 39 and ALT 24.  May Called pt: She is on Aldactone 100mg 2 am and Lasix 80mg and in p, aldactone 100mg po qpn and 40mg po qpm.  The pm doses were added at [facility] by oltx due to fluid and had need for thoracentesis. She will contact [facility] tomorrow as they were talking re doing the tips and need to consider who to address rising labs with inc med dosing.  Suspect the pm doses likely need to be dropped or stopped and see how does. We will fax the labs tomorrow am to Dr Andres so that they can look at these and decide on best next option given listed now.   Due for ct in July and she did a ct in april and was told was ok.   3/20/24 labs : The glucose 167, this is elevated if you are fasting. Creatinine 0.6, sodium low at 132, calcium slightly low at 8.5, lower limits of normal is 8.6. Albumin 3.3 and this is on follow-up. Bilirubin 7.4, alkaline phosphatase 140, AST 39, ALT 24. The white blood cells 5.2, hemoglobin 13.1, .4 which is elevated need to be sure you are taking your vitamin B12 and folic acid. Platelets 83 and this is low and continue to monitor. INR 1.2. MELD 15, MELD na 19 so lower and continue to monitor and review at the follow visit.  She has been seen and saw them Feb and april 19 at [facility].  Waiting on 6m counseling to finish in May.  3/11/24 MRI was sent to me. The liver without fat or iron. They did see cirrhosis but did not see any focal lesions. The gallbladder/biliary tree with gallstones but they did not see any inflammation. Spleen enlarged at 17 cm. The pancreas with cysts that they thought were most consistent with intraductal papillary mucinous neoplasms or IPMNs and these are benign. Largest was measuring up to 10 mm, we will review this at your follow-up visit and see if we can compare this to any prior imaging to see if we need to take a closer look.  They did not see any ductal dilatation. The adrenal glands, kidneys, gastrointestinal, lymph nodes all normal. They noted the hepatic vasculature was patent. They noted varices. Moderate ascites was seen. And they noted a large umbilical hernia containing some fat and ascites as well. Overall they saw cirrhosis with portal hypertension but they did not see any hepatocellular carcinoma. They also saw pancreatic cystic lesions which they thought could be IPMN's. Will review this at your follow-up.  Not needing any ascites  taps since earlier but chest now that is issues.  Weight June 149 and prior visit was 146 and post tap drops to 140 or so.   1/31/24 sophy. SHe went for paracentesis on 1/24/24 and not enough fluid to do She is still on aldactone 200mg and lasix 80 mg They increased her lactulose to 3 times a day 30 mL a day to help with constipation and this is helping.  She pending colonoscpy and they are going to try to work her in . They are going to do a repeat EGD at the same time.   She has a mammogram monday on right breast and us for oltx and did and was ok and not an issue. She is doing SARP current with insurance     12/6/23 healow MELD 22 on 11/28/23 labs  she was at the ER this weekend she had complex migraine she saw dr. andres starting oltx  she is now  200 mg aldactone and 80 mg lasix and takign all in am  she had labs done on 12/3/23 at er and stable cr and lytes  she will do day long visit soon to start tolx process  sh eis still doing paracentesis  She is doing a new counselor this after   11/6/23 telemedicine  11/2/23 labs were sent to me., Hemoglobin 12.3, MCV 98, platelets 54 and this is low. Glucose 140, creatinine 0.56, sodium 135, potassium 4.3, bilirubin 7.6, alkaline phosphatase 122, AST 43, ALT 23. INR 1.4, back up. MELD up at 19, MELD-na 19.   she is using the bathroom more so diuretics may be helping weight 170 and still doing paracentesis every 2 weeks so not supriya to be any different she gains on average 10 lbs between them  still have appt 11/28/23 with dr. andres.    10/26/23 ov SHe has oltx appt on 11/28 she had nathen yesterday and removed 4.6 L Happy to see this.  THis was 9 days after previous, so sooner bc the previous had suction issues so not as much done.  She is shcedule through the end of the year   The 10/23/23 labs were sent to me. The white blood cells low at 3.5 and we will continue to monitor. Red blood cells 3.7, hemoglobin 12, .5 and this is elevated be sure you are taking the B12. Platelets low at 71 and we will continue to monitor. The glucose 114, creatinine 0.62, sodium 138, potassium 4.3, bilirubin 7.5, alkaline phosphatase 122, AST 42, ALT 24, INR 1.3. Bilirubin slightly lower which is good to see. INR down from 1.4 which is also good to see. MELD still up at 17  The 10/11/23 labs were sent to me. The glucose 106 which is elevated if you are fasting. Creatinine 0.7, sodium 137, potassium 4.8, bilirubin 7.9, alkaline phosphatase 116, AST 45, ALT 22. White blood cells low at 3.7, red blood cells 3.5, hemoglobin 12.8, , platelets 66. INR 1.4. The bilirubin slightly lower now as previously it was 8.8. The AST at 45 and we will need to continue to monitor this. MELD 18. Please let us know if you have not been able to get the appointment with [facility] oltx team. We will see what they look like next with with increasing the aldactone in efforts to try to slow need for paracentesis.    recap 10/10/23 guerita Dear Lupe Song, Sept 27: Ammonia normal 48 and normal less than 72 umol/L. Would still add the xifaxan as we saw the asterixis clincially and you have the memory lapses and see how you do. Inr 1.3 down from 1.4 from 1.5. Glu 111 elevated from 104. Bun 14 and cr 0.7. Na 134 and k 4.3 and cl 100 and co2 20 and ca 8.5 little low. Albumin 3.3 and little lower and due to the taps and liver disease. Tb 8.8 and down 9.4. Alk 142 and up from 113. Ast 37 and alt 16. Prior ast 35 and alt 16. Wbc 5.3 hg 12 and mcv 100.8 up but little lower. Platelets 70 and down from 81. Neutrophils 3498 and lymphs 1643 normal but monocytes low 106. Meld 18 and meld na 20. Lets see how you do with the diuretics and lets follow the labs and weight trends closely. Called the pt to discuss labs. No side effects from the xifaxan and not seeing a change. Weight 177.2 and 1 pound up from last week. Nothing yet from [facility].  She had paracentesis on 10/3/23 had paracentesis and took off 7.5 L.  She is now 166.  wbc 4.8 red blood cells 3.66 mcv 108.7, platelets 84, 13.4,  inr 1.6,  cr. 0.7, ast 43, alt 19, alp 151, tb 7.2 MELD 19 MELD na 23.   she  is taking the aldactone 50 and lasix 20 mg and we will go up on this  Dr Gee Dear Lupe Song, September 1 ultrasound sent in and it showed a 5.9 L tap. It states specifically they did not do any studies on the fluid as apparently they do not have a standing order lab study protocol requested for this. Again this is one of the limitations of the doing the taps elsewhere. September 22 ultrasound also sent in and 4.7 L was removed and again the fluid was discarded as there was no requested analysis and no standing order protocol to do so. Here they do this on the fluid automatically. That explains why we never received any other fluid studies from there. It is possible that the team there could speak with them to get them to do the studies with the next tap. Dr. Gee recap She had been less jaundiced and she says over the last 5 weeks people commented.  3m off alcohol and send over to the oltx,  Doing therapy with private therapist.  Refer to [facility] as 3m and not getting better.  Did ct at [facility] and done for pain post tap and that was new and chills and Dr Tolentino and went in and colitis.  They gave her abx and bentyl.   Sept 20 labs show inr little up 1.4 but down from 1.5. Glucose 104 elevated but down from 132. Bun 15 and cr 0.67 and na 135 and k 4.5 and cl 101 and co2 23 and calcium 8.5 and little low and may want to share with primary and look at this and your vitamin d level status. Albumin little lower 3.4 from 3.7 prior. Total bili 9.4 and up from 6.2 and prior 7.3. Alk 113 and ast 35 and alt 16. Prior ast 9 and alt 18. Wbc 3.9 and hg 12.7 and mcv 101.7 elevated. Platelets low 81. Rbc little low 3.47 and mch little up 36.6. Neutrophils 2543 and lymphs 1045 normal. Monocytes little low 191. Meld 19 and meld na 20. This went up from 17 and concerning to see.  She did tap friday and got 4.5 liters off and says did tap on side and not drain as much.  re diuretics she was on lasix 20mg and aldactone once day.  Can go up to bid on those and do labs next week and the following,  telemed in 2 weeks and see doing.  July 31st labs showed your iron sat was elevated at 55%. We may need to do a hemochromatosis panel to follow-up on that to see why it is running high. It could still also be high as well because of the alcohol use previously. Ceruloplasmin was normal at 32. Ferritin was also again up in keeping with iron being up at 440. Hep B immunity was seen at a level of 105. Your alpha-1 screen was normal at MM type. Sugar was up at 132 and higher from July 15 when it was 115. BUN is 16 creatinine 0.65 sodium 135 potassium 4.6 calcium 8.9 albumin 3.7 normal, bilirubin was 6.2 down from 7.3 (not fractionated and need that done next time) and alk phos was 154 from 158 and AST 39 from 36 and ALT of 18 from 14. WBC was 4.2, hemoglobin 12.4 and platelet count low at 67. MCV was 105. INR was elevated at 1.5 with a prothrombin time 17.4. No hep A immunity was seen. You should consider getting the hepatitis A vaccine series electively. Summary: DF 17.2 so less than 32. Meld 18 and meld na 19 so less than 21 which is good to see but would want it to be less than 15 and above that.  Also, as you recall, you had previously in June when back in the hospital had a MELD score that had been less than 21 and a DF score of less than 32. You had lost a substantial amount of weight also between your illness and your diuretics. Despite the high sugar your A1c was reported not to be up. Need to follow trends as more time passes. Treatment is as we discussed supportive and to watch for any issues with your diuretics or labs over time. Continued abstinence is key.  recap:  Patient was last seen by them on July 14, 2023 for a posthospital visit.  She was apparently admitted on June 27, 2023 to a local facility for worsening abdominal distention and edema.  She had a history of alcohol usage. Pt at the time was having several glasses at night for years.  Dr Wills is for women having more 2 drinks a day will lead to cirrhosis over time.  Her father apparently passed away in March and she had been drinking heavily until then.  Alcohol stopped since June 25/26 and has not done any counseling.  Admission labs reported a hemoglobin of 12.2 platelet count 102 INR 1.6 bilirubin 4.3 alk phos 203 AST 69 ALT 20 lipase 97 ethanol level 222. Hepatitis ABC panel was reported negative.  Patient had a MELD score reported to be 17 but do not have a DF score. Severe is alc hep meld over 21. But above the 15 cut off for oltx.  CT of the abdomen showed cirrhotic nodular liver as well as portal pretension and diffuse abdominal pelvic ascites.  June 28, 2023 EGD was reported normal.  June 27,011 and 11 L paracentesis was done on June 29 with 7.5 L paracentesis was removed.  July 18 7.5 L and 4th one this friday.  She is learing to be on low salt diet.  She had a tap tomorrow will be 2 weeks post the last one.  Patient was started on Lasix 20 and Aldactone 50 mg a day and was inc since to 40 and 100mg  Weight is down 75 from admit and part fluid and part not.  Still losing some.  The ascites may be better with the dose inc.  She was given lactulose mostly for constipation per their note and not for encephalopathy issues.  When seen on July 14 she was not on any special diet but had lost about 11 pounds more amd was 251 admit since leaving the hospital.  Weight was noted to be 194.4 pounds at the time with a BMI of 34.43.  Abdomen is reported to be distended then.  Local labs from July 14 showed AFP of 4.6. With a sugar of 115 BUN of 12 creatinine 0.5 sodium 136 potassium 4.7 chloride 103 CO2 of 21 calcium 8.8 albumin diminished at 3.4 bilirubin elevated 7.3 alkaline phosphatase 158 AST of 36 and ALT of 14. WBC 4.2 hemoglobin 13.3 plate count 94 .4 with neutrophils 2617 and lymphocytes 1205. Prothrombin time was listed as 14.4 with normal up to 11.5 seconds there. INR was 1.4.  Df 20.6  Meld 18/19.  Able to review some of the [facility] records directly and saw that on June 28, 2023 Dr. Perkins did the EGD and it was normal with the Z-line at 37 cm and the stomach and duodenum also appeared normal.  The June 27, 2023 admit consult mentions how patient had been complaining about edema for a week and had been using alcohol at least a bottle of wine per day for several years and then had cut back to 3 to 4 glasses a day but then when her father passed in March she was drinking more heavily and came in with the hemoglobin of 12.2 INR 1.6 platelets 102 bilirubin 4.3 alk phos 283 AST 69 ALT 20 lipase 97 and ethanol of 222.  Mention how the CT again has shown the diffuse abdominal pelvic ascites as well as sequela of portal pretension and the cirrhosis.  She never had a colonoscopy. Previous EGD had been 20 years ago.  Other past medical history listed ADHD, alcohol use, anxiety, diabetes, ascites. Jaundice.  A1c 4.4 % without meds.  Alcohol usage again was as listed above. Denied tobacco usage.  CT scan had shown lower thorax with no effusion of the pleural or pericardial area but did a cirrhotic nodular liver with sequela of hypertension including gastroesophageal and splenic varices and moderate to large intra-abdominal ascites. The fluid appeared simple. Gallbladder was unremarkable. No bile duct dilation seen. Spleen was enlarged. Splenic mass. Perisplenic ascites were seen. No significant bowel wall thickening or evidence of free air was seen. Atherosclerotic calcifications were seen in the abdominal aorta and branches but without aneurysm. They felt her MELD score was 17 at the time and they plan for the EGD to be done. July 18, 2023 ultrasound was 7.1 L. June 29, 2020 ultrasound 7.5 L. June 27, 2000 2311 L.  Discharge summary mentions she was admitted from the 27th of the 29th and that she was scheduled to follow-up with Dr. Cruz.  She was a 3m wait so was set up to see u.s.  They had recommended her to be on thiamine and folic acid.  She was kept on her Lasix and Aldactone after her paracentesis treatments. She was monitored for withdrawal and they were not noted. She was kept on her citalopram and Adderall for her anxiety/ADHD/depression.  She was reported to have type 2 diabetes but with an A1c of 4.4 and been off of medicines for 2 years.  Discharge medicines include citalopram 1 and half tablets at night, folic acid 100-day, Lasix 20 mg a day, lactulose 15 mL once a day as needed, Aldactone 50 mg a day and thiamine 100 mg a day.   Plan: 1. Plan is to go for the tips soon at [facility] due to refractory hydrothorax. 2. Pt is not needing abd taps. Diuretics helped that but not the other yet. 3. Meld 19 and on list. 4. See her in 2 months to follow course. 5. SHe is doing ct in July.   Duration of visit was 42 minutes with 20 minutes spent prepping chart and loading info from labs and contacts for the visit to [facility] records and then 22 additional minutes from 846 am tro 908 am for this healow telemed with time spent  reviewing chart and events and plans with the pt.

## 2024-06-17 ENCOUNTER — LAB OUTSIDE AN ENCOUNTER (OUTPATIENT)
Dept: URBAN - METROPOLITAN AREA TELEHEALTH 2 | Facility: TELEHEALTH | Age: 39
End: 2024-06-17

## 2024-08-30 ENCOUNTER — OFFICE VISIT (OUTPATIENT)
Dept: URBAN - METROPOLITAN AREA CLINIC 86 | Facility: CLINIC | Age: 39
End: 2024-08-30
Payer: COMMERCIAL

## 2024-08-30 VITALS
DIASTOLIC BLOOD PRESSURE: 70 MMHG | HEART RATE: 86 BPM | BODY MASS INDEX: 27.82 KG/M2 | HEIGHT: 63 IN | SYSTOLIC BLOOD PRESSURE: 113 MMHG | TEMPERATURE: 97 F | WEIGHT: 157 LBS

## 2024-08-30 DIAGNOSIS — K70.31 ALCOHOLIC CIRRHOSIS OF LIVER WITH ASCITES: ICD-10-CM

## 2024-08-30 DIAGNOSIS — K92.2 GI BLEED: ICD-10-CM

## 2024-08-30 DIAGNOSIS — K58.9 IRRITABLE BOWEL SYNDROME: ICD-10-CM

## 2024-08-30 DIAGNOSIS — K70.11 ALCOHOLIC HEPATITIS WITH ASCITES: ICD-10-CM

## 2024-08-30 DIAGNOSIS — F90.9 ADHD: ICD-10-CM

## 2024-08-30 DIAGNOSIS — K76.82 HEPATIC ENCEPHALOPATHY: ICD-10-CM

## 2024-08-30 DIAGNOSIS — D49.0 IPMN (INTRADUCTAL PAPILLARY MUCINOUS NEOPLASM): ICD-10-CM

## 2024-08-30 DIAGNOSIS — F10.91 ALCOHOL USE DISORDER IN REMISSION: ICD-10-CM

## 2024-08-30 DIAGNOSIS — Z71.85 VACCINE COUNSELING: ICD-10-CM

## 2024-08-30 DIAGNOSIS — E11.9 DIABETES: ICD-10-CM

## 2024-08-30 DIAGNOSIS — F41.9 ANXIETY AND DEPRESSION: ICD-10-CM

## 2024-08-30 DIAGNOSIS — Z79.899 HIGH RISK MEDICATION USE: ICD-10-CM

## 2024-08-30 PROCEDURE — 99215 OFFICE O/P EST HI 40 MIN: CPT

## 2024-08-30 RX ORDER — RIFAXIMIN 550 MG/1
1 TABLET TABLET ORAL TWICE A DAY
Status: ACTIVE | COMMUNITY

## 2024-08-30 RX ORDER — FOLIC ACID 1 MG/1
1 TABLET TABLET ORAL ONCE A DAY
Status: ACTIVE | COMMUNITY

## 2024-08-30 RX ORDER — THIAMINE HCL 100 MG
1 TABLET TABLET ORAL ONCE A DAY
Status: ACTIVE | COMMUNITY

## 2024-08-30 RX ORDER — CITALOPRAM 20 MG/1
1 TABLET TABLET, FILM COATED ORAL ONCE A DAY
Status: ACTIVE | COMMUNITY

## 2024-08-30 RX ORDER — LACTULOSE 10 G/15ML
30ML BY MOUTH THREE TIMES A DAY AS DIRECTED SOLUTION ORAL
Qty: 2365 UNSPECIFIED | Refills: 0 | Status: ACTIVE | COMMUNITY

## 2024-08-30 RX ORDER — SPIRONOLACTONE 100 MG/1
1 TABLET TABLET, FILM COATED ORAL ONCE A DAY
Qty: 30 TABLET | Refills: 1 | Status: ACTIVE | COMMUNITY

## 2024-08-30 RX ORDER — FUROSEMIDE 40 MG/1
1 TABLET TABLET ORAL ONCE A DAY
Status: ACTIVE | COMMUNITY

## 2024-08-30 NOTE — EXAM-PHYSICAL EXAM
Gen: awake and responsive. Icteric sclera   Eyes: icteric, normal lids.   Mouth:Normal lips.   Nose: no drianage.   Hearing: intact grossly.   Neck: trachea midline and no jvd.   CV: RRR no s3.   Lungs: clear. No wheezes,   Abd: Soft, nabs, NR, NT. No appreciable ascites and liver not enlarged and spleen tip tarce palpable.   Ext:no sig leg edema, some palm erythema.   Neuro: moves all 4 ext grossly. Slight tremor/asterixis.

## 2024-08-30 NOTE — HPI-TODAY'S VISIT:
Patient is a 39-year-old female seen June 2024 at the referral from Barb Espinosa nurse practitioner and Dr. Antoine Gomez for evaluation of recently noted cirrhosis suspected due to prior alcohol.  A copy of the note will be sent to the referring provider.  She says meld is at 23 now and she says almost got it in Aug 2nd week and had some bleeding and saw had cut gum and bleeding and factors given.  Tips was done July 1 and successful.   Tapped x 2 and both times less than a liter and prior was once a week getting tapped.  She says sugars off and on insulin.  Says next mri in Jan and do afp and been ok.  Last Meld score is running 19 and we suspected they would put in a small tips.  She says tb went from to 3s and now in 8s and this is post tips. Lowest post tips was 4.  Prior diuretics aldactone 200mg and lasix 80mh and then dropped ald 100mg and 40mg lasix and assessing and dosing.  Did egd and added protoni and not oozing and some bleeding.  Aug 29 [facility] note: from Family medicine at [facility]. Cellulitis where monitor was and placed her on keflex.   May 7 labs showed glucose still elevated at 159 but down from 167 in March.  BUN up a little at 37 and creatinine of 1.08 from previous BUN of 24 and creatinine 0.86.  Unclear if you are running a little dry this day. Sodium 129 a little bit lower and potassium upper normal at 5.1.  Previously sodium 132 and potassium 4.1. Chloride 96 is a little low.  CO2 of 24.  Albumin normal at 4.1 and that could be from post albumin infusions potentially or if you are running a little dry?  Bilirubin up to 6.3, alk phos 127 AST 54 and ALT 33 previously bilirubin 5.4 alk phos 140 AST 39 and ALT 24.  May Called pt: She is on Aldactone 100mg 2 am and Lasix 80mg and in p, aldactone 100mg po qpn and 40mg po qpm.  The pm doses were added at [facility] by oltx due to fluid and had need for thoracentesis. She will contact [facility] tomorrow as they were talking re doing the tips and need to consider who to address rising labs with inc med dosing.  Suspect the pm doses likely need to be dropped or stopped and see how does. We will fax the labs tomorrow am to Dr Andres so that they can look at these and decide on best next option given listed now.   Due for ct in July and she did a ct in april and was told was ok.   3/20/24 labs : The glucose 167, this is elevated if you are fasting. Creatinine 0.6, sodium low at 132, calcium slightly low at 8.5, lower limits of normal is 8.6. Albumin 3.3 and this is on follow-up. Bilirubin 7.4, alkaline phosphatase 140, AST 39, ALT 24. The white blood cells 5.2, hemoglobin 13.1, .4 which is elevated need to be sure you are taking your vitamin B12 and folic acid. Platelets 83 and this is low and continue to monitor. INR 1.2. MELD 15, MELD na 19 so lower and continue to monitor and review at the follow visit.  She has been seen and saw them Feb and april 19 at [facility].  Waiting on 6m counseling to finish in May.  3/11/24 MRI was sent to me. The liver without fat or iron. They did see cirrhosis but did not see any focal lesions. The gallbladder/biliary tree with gallstones but they did not see any inflammation. Spleen enlarged at 17 cm. The pancreas with cysts that they thought were most consistent with intraductal papillary mucinous neoplasms or IPMNs and these are benign. Largest was measuring up to 10 mm, we will review this at your follow-up visit and see if we can compare this to any prior imaging to see if we need to take a closer look.  They did not see any ductal dilatation. The adrenal glands, kidneys, gastrointestinal, lymph nodes all normal. They noted the hepatic vasculature was patent. They noted varices. Moderate ascites was seen. And they noted a large umbilical hernia containing some fat and ascites as well. Overall they saw cirrhosis with portal hypertension but they did not see any hepatocellular carcinoma. They also saw pancreatic cystic lesions which they thought could be IPMN's. Will review this at your follow-up.  Not needing any ascites  taps since earlier but chest now that is issues.  Weight June 149 and prior visit was 146 and post tap drops to 140 or so.   1/31/24 healow. SHe went for paracentesis on 1/24/24 and not enough fluid to do She is still on aldactone 200mg and lasix 80 mg They increased her lactulose to 3 times a day 30 mL a day to help with constipation and this is helping.  She pending colonoscpy and they are going to try to work her in . They are going to do a repeat EGD at the same time.   She has a mammogram monday on right breast and us for oltx and did and was ok and not an issue. She is doing SARP current with insurance     12/6/23 healow MELD 22 on 11/28/23 labs  she was at the ER this weekend she had complex migraine she saw dr. andres starting oltx  she is now  200 mg aldactone and 80 mg lasix and takign all in am  she had labs done on 12/3/23 at er and stable cr and lytes  she will do day long visit soon to start tolx process  sh eis still doing paracentesis  She is doing a new counselor this after   11/6/23 telemedicine  11/2/23 labs were sent to me., Hemoglobin 12.3, MCV 98, platelets 54 and this is low. Glucose 140, creatinine 0.56, sodium 135, potassium 4.3, bilirubin 7.6, alkaline phosphatase 122, AST 43, ALT 23. INR 1.4, back up. MELD up at 19, MELD-na 19.   she is using the bathroom more so diuretics may be helping weight 170 and still doing paracentesis every 2 weeks so not supriya to be any different she gains on average 10 lbs between them  still have appt 11/28/23 with dr. andres.    10/26/23 ov SHe has oltx appt on 11/28 she had nathen yesterday and removed 4.6 L Happy to see this.  THis was 9 days after previous, so sooner bc the previous had suction issues so not as much done.  She is shcedule through the end of the year   The 10/23/23 labs were sent to me. The white blood cells low at 3.5 and we will continue to monitor. Red blood cells 3.7, hemoglobin 12, .5 and this is elevated be sure you are taking the B12. Platelets low at 71 and we will continue to monitor. The glucose 114, creatinine 0.62, sodium 138, potassium 4.3, bilirubin 7.5, alkaline phosphatase 122, AST 42, ALT 24, INR 1.3. Bilirubin slightly lower which is good to see. INR down from 1.4 which is also good to see. MELD still up at 17  The 10/11/23 labs were sent to me. The glucose 106 which is elevated if you are fasting. Creatinine 0.7, sodium 137, potassium 4.8, bilirubin 7.9, alkaline phosphatase 116, AST 45, ALT 22. White blood cells low at 3.7, red blood cells 3.5, hemoglobin 12.8, , platelets 66. INR 1.4. The bilirubin slightly lower now as previously it was 8.8. The AST at 45 and we will need to continue to monitor this. MELD 18. Please let us know if you have not been able to get the appointment with [facility] oltx team. We will see what they look like next with with increasing the aldactone in efforts to try to slow need for paracentesis.    recap 10/10/23 ov Dear Lupe Song, Sept 27: Ammonia normal 48 and normal less than 72 umol/L. Would still add the xifaxan as we saw the asterixis clincially and you have the memory lapses and see how you do. Inr 1.3 down from 1.4 from 1.5. Glu 111 elevated from 104. Bun 14 and cr 0.7. Na 134 and k 4.3 and cl 100 and co2 20 and ca 8.5 little low. Albumin 3.3 and little lower and due to the taps and liver disease. Tb 8.8 and down 9.4. Alk 142 and up from 113. Ast 37 and alt 16. Prior ast 35 and alt 16. Wbc 5.3 hg 12 and mcv 100.8 up but little lower. Platelets 70 and down from 81. Neutrophils 3498 and lymphs 1643 normal but monocytes low 106. Meld 18 and meld na 20. Lets see how you do with the diuretics and lets follow the labs and weight trends closely. Called the pt to discuss labs. No side effects from the xifaxan and not seeing a change. Weight 177.2 and 1 pound up from last week. Nothing yet from [facility].  She had paracentesis on 10/3/23 had paracentesis and took off 7.5 L.  She is now 166.  wbc 4.8 red blood cells 3.66 mcv 108.7, platelets 84, 13.4,  inr 1.6,  cr. 0.7, ast 43, alt 19, alp 151, tb 7.2 MELD 19 MELD na 23.   she  is taking the aldactone 50 and lasix 20 mg and we will go up on this  Dr Gee Dear Lupe Song, September 1 ultrasound sent in and it showed a 5.9 L tap. It states specifically they did not do any studies on the fluid as apparently they do not have a standing order lab study protocol requested for this. Again this is one of the limitations of the doing the taps elsewhere. September 22 ultrasound also sent in and 4.7 L was removed and again the fluid was discarded as there was no requested analysis and no standing order protocol to do so. Here they do this on the fluid automatically. That explains why we never received any other fluid studies from there. It is possible that the team there could speak with them to get them to do the studies with the next tap. Dr. Gee recap She had been less jaundiced and she says over the last 5 weeks people commented.  3m off alcohol and send over to the oltx,  Doing therapy with private therapist.  Refer to [facility] as 3m and not getting better.  Did ct at [facility] and done for pain post tap and that was new and chills and Dr Tolentino and went in and colitis.  They gave her abx and bentyl.   Sept 20 labs show inr little up 1.4 but down from 1.5. Glucose 104 elevated but down from 132. Bun 15 and cr 0.67 and na 135 and k 4.5 and cl 101 and co2 23 and calcium 8.5 and little low and may want to share with primary and look at this and your vitamin d level status. Albumin little lower 3.4 from 3.7 prior. Total bili 9.4 and up from 6.2 and prior 7.3. Alk 113 and ast 35 and alt 16. Prior ast 9 and alt 18. Wbc 3.9 and hg 12.7 and mcv 101.7 elevated. Platelets low 81. Rbc little low 3.47 and mch little up 36.6. Neutrophils 2543 and lymphs 1045 normal. Monocytes little low 191. Meld 19 and meld na 20. This went up from 17 and concerning to see.  She did tap friday and got 4.5 liters off and says did tap on side and not drain as much.  re diuretics she was on lasix 20mg and aldactone once day.  Can go up to bid on those and do labs next week and the following,  telemed in 2 weeks and see doing.  July 31st labs showed your iron sat was elevated at 55%. We may need to do a hemochromatosis panel to follow-up on that to see why it is running high. It could still also be high as well because of the alcohol use previously. Ceruloplasmin was normal at 32. Ferritin was also again up in keeping with iron being up at 440. Hep B immunity was seen at a level of 105. Your alpha-1 screen was normal at MM type. Sugar was up at 132 and higher from July 15 when it was 115. BUN is 16 creatinine 0.65 sodium 135 potassium 4.6 calcium 8.9 albumin 3.7 normal, bilirubin was 6.2 down from 7.3 (not fractionated and need that done next time) and alk phos was 154 from 158 and AST 39 from 36 and ALT of 18 from 14. WBC was 4.2, hemoglobin 12.4 and platelet count low at 67. MCV was 105. INR was elevated at 1.5 with a prothrombin time 17.4. No hep A immunity was seen. You should consider getting the hepatitis A vaccine series electively. Summary: DF 17.2 so less than 32. Meld 18 and meld na 19 so less than 21 which is good to see but would want it to be less than 15 and above that.  Also, as you recall, you had previously in June when back in the hospital had a MELD score that had been less than 21 and a DF score of less than 32. You had lost a substantial amount of weight also between your illness and your diuretics. Despite the high sugar your A1c was reported not to be up. Need to follow trends as more time passes. Treatment is as we discussed supportive and to watch for any issues with your diuretics or labs over time. Continued abstinence is key.  recap:  Patient was last seen by them on July 14, 2023 for a posthospital visit.  She was apparently admitted on June 27, 2023 to a local facility for worsening abdominal distention and edema.  She had a history of alcohol usage. Pt at the time was having several glasses at night for years.  Dr Wills is for women having more 2 drinks a day will lead to cirrhosis over time.  Her father apparently passed away in March and she had been drinking heavily until then.  Alcohol stopped since June 25/26 and has not done any counseling.  Admission labs reported a hemoglobin of 12.2 platelet count 102 INR 1.6 bilirubin 4.3 alk phos 203 AST 69 ALT 20 lipase 97 ethanol level 222. Hepatitis ABC panel was reported negative.  Patient had a MELD score reported to be 17 but do not have a DF score. Severe is alc hep meld over 21. But above the 15 cut off for oltx.  CT of the abdomen showed cirrhotic nodular liver as well as portal pretension and diffuse abdominal pelvic ascites.  June 28, 2023 EGD was reported normal.  June 27,011 and 11 L paracentesis was done on June 29 with 7.5 L paracentesis was removed.  July 18 7.5 L and 4th one this friday.  She is learing to be on low salt diet.  She had a tap tomorrow will be 2 weeks post the last one.  Patient was started on Lasix 20 and Aldactone 50 mg a day and was inc since to 40 and 100mg  Weight is down 75 from admit and part fluid and part not.  Still losing some.  The ascites may be better with the dose inc.  She was given lactulose mostly for constipation per their note and not for encephalopathy issues.  When seen on July 14 she was not on any special diet but had lost about 11 pounds more amd was 251 admit since leaving the hospital.  Weight was noted to be 194.4 pounds at the time with a BMI of 34.43.  Abdomen is reported to be distended then.  Local labs from July 14 showed AFP of 4.6. With a sugar of 115 BUN of 12 creatinine 0.5 sodium 136 potassium 4.7 chloride 103 CO2 of 21 calcium 8.8 albumin diminished at 3.4 bilirubin elevated 7.3 alkaline phosphatase 158 AST of 36 and ALT of 14. WBC 4.2 hemoglobin 13.3 plate count 94 .4 with neutrophils 2617 and lymphocytes 1205. Prothrombin time was listed as 14.4 with normal up to 11.5 seconds there. INR was 1.4.  Df 20.6  Meld 18/19.  Able to review some of the [facility] records directly and saw that on June 28, 2023 Dr. Perkins did the EGD and it was normal with the Z-line at 37 cm and the stomach and duodenum also appeared normal.  The June 27, 2023 admit consult mentions how patient had been complaining about edema for a week and had been using alcohol at least a bottle of wine per day for several years and then had cut back to 3 to 4 glasses a day but then when her father passed in March she was drinking more heavily and came in with the hemoglobin of 12.2 INR 1.6 platelets 102 bilirubin 4.3 alk phos 283 AST 69 ALT 20 lipase 97 and ethanol of 222.  Mention how the CT again has shown the diffuse abdominal pelvic ascites as well as sequela of portal pretension and the cirrhosis.  She never had a colonoscopy. Previous EGD had been 20 years ago.  Other past medical history listed ADHD, alcohol use, anxiety, diabetes, ascites. Jaundice.  A1c 4.4 % without meds.  Alcohol usage again was as listed above. Denied tobacco usage.  CT scan had shown lower thorax with no effusion of the pleural or pericardial area but did a cirrhotic nodular liver with sequela of hypertension including gastroesophageal and splenic varices and moderate to large intra-abdominal ascites. The fluid appeared simple. Gallbladder was unremarkable. No bile duct dilation seen. Spleen was enlarged. Splenic mass. Perisplenic ascites were seen. No significant bowel wall thickening or evidence of free air was seen. Atherosclerotic calcifications were seen in the abdominal aorta and branches but without aneurysm. They felt her MELD score was 17 at the time and they plan for the EGD to be done. July 18, 2023 ultrasound was 7.1 L. June 29, 2020 ultrasound 7.5 L. June 27, 2000 2311 L.  Discharge summary mentions she was admitted from the 27th of the 29th and that she was scheduled to follow-up with Dr. Cruz.  She was a 3m wait so was set up to see u.s.  They had recommended her to be on thiamine and folic acid.  She was kept on her Lasix and Aldactone after her paracentesis treatments. She was monitored for withdrawal and they were not noted. She was kept on her citalopram and Adderall for her anxiety/ADHD/depression.  She was reported to have type 2 diabetes but with an A1c of 4.4 and been off of medicines for 2 years.  Discharge medicines include citalopram 1 and half tablets at night, folic acid 100-day, Lasix 20 mg a day, lactulose 15 mL once a day as needed, Aldactone 50 mg a day and thiamine 100 mg a day.   Plan: 1. Patient is meld 23 now and to get herself checked and looka more jaundice. 2. Pt had tap x 2 and doing better now and on half dose meds. 3. Pt hoping to get soon. 4. Pt will do labs at [facility] so that they can update meld.    Duration of visit was 50  minutes with 10 minutes spent prepping chart and loading info from labs and contacts for the visit to [facility] records and then 40 additional minutes for this face to face visit with time spent reviewing chart and events and plans with the pt and in discussing plans.

## 2024-10-28 ENCOUNTER — TELEPHONE ENCOUNTER (OUTPATIENT)
Dept: URBAN - METROPOLITAN AREA CLINIC 86 | Facility: CLINIC | Age: 39
End: 2024-10-28

## 2024-10-28 RX ORDER — RIFAXIMIN 550 MG/1
1 TABLET TABLET ORAL TWICE A DAY
Qty: 180 TABLET | Refills: 0

## 2024-11-26 ENCOUNTER — TELEPHONE ENCOUNTER (OUTPATIENT)
Dept: URBAN - METROPOLITAN AREA CLINIC 86 | Facility: CLINIC | Age: 39
End: 2024-11-26

## 2024-11-26 NOTE — HPI-TODAY'S VISIT:
Updated recent November 10 labs noted showing glucose 106 BUN is 16 creatinine 0.78 sodium one 39,003.5 CO2 22 calcium 8.5 albumin 3.2 AST 39 ALT 17 alk phos 126 bilirubin elevated at 5.9.  TSH 2.44 which was up a little bit for her.  Ethyl alcohol screen was negative at less than 10.  INR was 1.14 with a prothrombin time 15.7.  WBC 4.3 hemoglobin 12.8 MCV 94.1 platelet count 99.  Neutrophils 2.8 lymphocytes 1.1.  Magnesium 2.1.  November 4 labs show albumin 3.2 AST 42 ALT 17 alk phos 127 bilirubin 5.4.  INR 1.41.  October 8024 labs showed albumin 3.6 AST 46 ALT 20 alk phos 121 bilirubin 5.7.  INR 1.45.  September 10,024 labs show AST 50 ALT 25 alk phos 145 bilirubin 4.4.  INR 1.48.  Patient in the emergency room November 10, 2024 with history of her cirrhosis, diabetes, hyperlipidemia status post TIPS and was noted to have intermittent tremors at that timeframe.  She has been waiting on her transplant.  She has been compliant with her lactulose, Aldactone and Lasix was to see Dr. Andres soon.  Last MELD score was 19.  Current medicines listed were being on citalopram 40 mg half a tablet a day, folic acid 800 mcg a day, furosemide 40 mg once a day.  Lantus 14 units in the morning and 14 at night.  Sliding scale insulin 15 units 3 times a day for blood glucose greater than 140.  Lactulose 30 mL 3 times a day, ondansetron 4 mg twice a day as needed, pantoprazole 40 minutes a day.  Spironolactone 100 mg a day, thiamine 100 once a day and send sulfate 2 and 20 mg twice a day.  Weight was listed as being 73.9 kg 163 pounds.  They did a CT of the head with no acute findings.  We gave her 1 L fluid bolus x 2 she has been feeling better with that.  No other acute findings were noted so she was discharged to follow-up with Dr. Andres.  CT scan was obtained September 25, 2024 at [place] that showed lung bases clear, liver cirrhotic with nodular contour and lobar redistribution and no suspicious lesions.  Shunt appears patent.  Vessels: The main hepatic vein were patent.  No intrahepatic bile duct dilation.  Small gallstones were seen.  Persistent splenomegaly.  No ascites was seen.  Small gastroesophageal varices seen.  No aneurysm dilation of the aorta is seen.  Small fat-containing umbilical hernia seen.  Cirrhotic liver seen including splenomegaly and small varices.  No lesions.  TIPS patent.

## 2024-12-02 ENCOUNTER — OFFICE VISIT (OUTPATIENT)
Dept: URBAN - METROPOLITAN AREA CLINIC 86 | Facility: CLINIC | Age: 39
End: 2024-12-02
Payer: COMMERCIAL

## 2024-12-02 VITALS
DIASTOLIC BLOOD PRESSURE: 70 MMHG | SYSTOLIC BLOOD PRESSURE: 115 MMHG | WEIGHT: 157 LBS | HEART RATE: 70 BPM | BODY MASS INDEX: 27.82 KG/M2 | HEIGHT: 63 IN | TEMPERATURE: 98 F

## 2024-12-02 DIAGNOSIS — F10.91 ALCOHOL USE DISORDER IN REMISSION: ICD-10-CM

## 2024-12-02 DIAGNOSIS — K58.9 IRRITABLE BOWEL SYNDROME: ICD-10-CM

## 2024-12-02 DIAGNOSIS — K70.11 ALCOHOLIC HEPATITIS WITH ASCITES: ICD-10-CM

## 2024-12-02 DIAGNOSIS — K92.2 GI BLEED: ICD-10-CM

## 2024-12-02 DIAGNOSIS — D49.0 IPMN (INTRADUCTAL PAPILLARY MUCINOUS NEOPLASM): ICD-10-CM

## 2024-12-02 DIAGNOSIS — F41.9 ANXIETY AND DEPRESSION: ICD-10-CM

## 2024-12-02 DIAGNOSIS — K76.82 HEPATIC ENCEPHALOPATHY: ICD-10-CM

## 2024-12-02 DIAGNOSIS — F90.9 ADHD: ICD-10-CM

## 2024-12-02 DIAGNOSIS — Z79.899 HIGH RISK MEDICATION USE: ICD-10-CM

## 2024-12-02 DIAGNOSIS — E11.9 DIABETES: ICD-10-CM

## 2024-12-02 DIAGNOSIS — K70.31 ALCOHOLIC CIRRHOSIS OF LIVER WITH ASCITES: ICD-10-CM

## 2024-12-02 DIAGNOSIS — S92.909A FOOT FRACTURE: ICD-10-CM

## 2024-12-02 DIAGNOSIS — Z71.85 VACCINE COUNSELING: ICD-10-CM

## 2024-12-02 PROCEDURE — 99215 OFFICE O/P EST HI 40 MIN: CPT

## 2024-12-02 RX ORDER — PANTOPRAZOLE SODIUM 40 MG/1
1 TABLET 1/2 TO 1 HOUR BEFORE MORNING MEAL TABLET, DELAYED RELEASE ORAL ONCE A DAY
Status: ACTIVE | COMMUNITY

## 2024-12-02 RX ORDER — SPIRONOLACTONE 100 MG/1
1 TABLET TABLET, FILM COATED ORAL ONCE A DAY
Qty: 30 TABLET | Refills: 1 | Status: ACTIVE | COMMUNITY

## 2024-12-02 RX ORDER — THIAMINE HCL 100 MG
1 TABLET TABLET ORAL ONCE A DAY
Status: ACTIVE | COMMUNITY

## 2024-12-02 RX ORDER — FUROSEMIDE 40 MG/1
1 TABLET TABLET ORAL ONCE A DAY
Status: ACTIVE | COMMUNITY

## 2024-12-02 RX ORDER — LACTULOSE 10 G/15ML
30ML BY MOUTH THREE TIMES A DAY AS DIRECTED SOLUTION ORAL
Qty: 2365 UNSPECIFIED | Refills: 0 | Status: ACTIVE | COMMUNITY

## 2024-12-02 RX ORDER — FOLIC ACID 1 MG/1
1 TABLET TABLET ORAL ONCE A DAY
Status: ACTIVE | COMMUNITY

## 2024-12-02 RX ORDER — CITALOPRAM 20 MG/1
1 TABLET TABLET, FILM COATED ORAL ONCE A DAY
Status: DISCONTINUED | COMMUNITY

## 2024-12-02 RX ORDER — RIFAXIMIN 550 MG/1
1 TABLET TABLET ORAL TWICE A DAY
Qty: 180 TABLET | Refills: 0 | Status: ACTIVE | COMMUNITY

## 2024-12-02 NOTE — HPI-TODAY'S VISIT:
Patient is a 39-year-old female seen Aug 2024 at the referral from Barb Espinosa nurse practitioner and Dr. Antoine Gomez for evaluation of recently noted cirrhosis suspected due to prior alcohol.  A copy of the note will be sent to the referring provider.  Updated recent [facility]: November 10 labs noted showing glucose 106 BUN is 16 creatinine 0.78 sodium 139, K 2.5 CO2 22 calcium 8.5 albumin 3.2 AST 39 ALT 17 alk phos 126 bilirubin elevated at 5.9.  TSH 2.44 which was up a little bit for her.  Ethyl alcohol screen was negative at less than 10.  INR was 1.14 with a prothrombin time 15.7.  WBC 4.3 hemoglobin 12.8 MCV 94.1 platelet count 99.  Neutrophils 2.8 lymphocytes 1.1.  Magnesium 2.1. She says meld was 19.  November 4 labs show albumin 3.2 AST 42 ALT 17 alk phos 127 bilirubin 5.4.  INR 1.41.  October 8024 labs showed albumin 3.6 AST 46 ALT 20 alk phos 121 bilirubin 5.7.  INR 1.45.  September 10,024 labs show AST 50 ALT 25 alk phos 145 bilirubin 4.4.  INR 1.48.  Patient in the emergency room November 10, 2024 with history of her cirrhosis, diabetes, hyperlipidemia status post TIPS and was noted to have intermittent tremors at that timeframe.  She has been waiting on her transplant.  She has been compliant with her lactulose, Aldactone and Lasix was to see Dr. Andres soon.  Last MELD score was 19.  Current medicines listed were being on citalopram 40 mg half a tablet a day, folic acid 800 mcg a day, furosemide 40 mg once a day.  Lantus 14 units in the morning and 14 at night.  Sliding scale insulin 15 units 3 times a day for blood glucose greater than 140.  Lactulose 30 mL 3 times a day, ondansetron 4 mg twice a day as needed, pantoprazole 40 minutes a day.  Spironolactone 100 mg a day, thiamine 100 once a day and send lasix 20 mg twice a day. xif bid.  Dr Andres thought was pse role. Kept of lactulose and xifaxan. Not to drive.  Weight was listed as being 73.9 kg 163 pounds.  They did a CT of the head with no acute findings. They gave her 1 L fluid bolus x 2 she has been feeling better with that.  No other acute findings were noted so she was discharged to follow-up with Dr. Andres.  CT scan was obtained September 25, 2024 at [facility] that showed lung bases clear, liver cirrhotic with nodular contour and lobar redistribution and no suspicious lesions.  Shunt appears patent. Vessels: The main hepatic vein were patent.  No intrahepatic bile duct dilation.  Small gallstones were seen.  Persistent splenomegaly.  No ascites was seen.  Small gastroesophageal varices seen.  No aneurysm dilation of the aorta is seen.  Small fat-containing umbilical hernia seen.  Cirrhotic liver seen including splenomegaly and small varices.  No lesions.  TIPS patent.  Last tap was right after tips in Aug.  She says meld is at 23 now and she says almost got it in Aug 2nd week and had some bleeding and saw had cut gum and bleeding and factors given.  Tips was done July 1 and successful.   Tapped x 2 and both times less than a liter and prior was once a week getting tapped.  She says sugars off and on insulin.  Last Meld score is running 19 and we suspected they would put in a small tips.  She says tb went from to 3s and now in 8s and this is post tips. Lowest post tips was 4.  Prior diuretics aldactone 200mg and lasix 80mh and then dropped ald 100mg and 40mg lasix and assessing and dosing.  Did egd and added protonix and not oozing and some bleeding.  Aug 29 [facility] note: from Family medicine at [facility]. Cellulitis where monitor was and placed her on keflex.   May 7 labs showed glucose still elevated at 159 but down from 167 in March.  BUN up a little at 37 and creatinine of 1.08 from previous BUN of 24 and creatinine 0.86.  Unclear if you are running a little dry this day. Sodium 129 a little bit lower and potassium upper normal at 5.1.  Previously sodium 132 and potassium 4.1. Chloride 96 is a little low.  CO2 of 24.  Albumin normal at 4.1 and that could be from post albumin infusions potentially or if you are running a little dry?  Bilirubin up to 6.3, alk phos 127 AST 54 and ALT 33 previously bilirubin 5.4 alk phos 140 AST 39 and ALT 24.  May Called pt: She is on Aldactone 100mg 2 am and Lasix 80mg and in p, aldactone 100mg po qpn and 40mg po qpm.  The pm doses were added at [facility] by oltx due to fluid and had need for thoracentesis. She will contact [facility] tomorrow as they were talking re doing the tips and need to consider who to address rising labs with inc med dosing.  Suspect the pm doses likely need to be dropped or stopped and see how does. We will fax the labs tomorrow am to Dr Andres so that they can look at these and decide on best next option given listed now.   Due for ct in July and she did a ct in april and was told was ok.   3/20/24 labs : The glucose 167, this is elevated if you are fasting. Creatinine 0.6, sodium low at 132, calcium slightly low at 8.5, lower limits of normal is 8.6. Albumin 3.3 and this is on follow-up. Bilirubin 7.4, alkaline phosphatase 140, AST 39, ALT 24. The white blood cells 5.2, hemoglobin 13.1, .4 which is elevated need to be sure you are taking your vitamin B12 and folic acid. Platelets 83 and this is low and continue to monitor. INR 1.2. MELD 15, MELD na 19 so lower and continue to monitor and review at the follow visit.  She has been seen and saw them Feb and april 19 at [facility].  Waiting on 6m counseling to finish in May.  3/11/24 MRI was sent to me. The liver without fat or iron. They did see cirrhosis but did not see any focal lesions. The gallbladder/biliary tree with gallstones but they did not see any inflammation. Spleen enlarged at 17 cm. The pancreas with cysts that they thought were most consistent with intraductal papillary mucinous neoplasms or IPMNs and these are benign. Largest was measuring up to 10 mm, we will review this at your follow-up visit and see if we can compare this to any prior imaging to see if we need to take a closer look.  They did not see any ductal dilatation. The adrenal glands, kidneys, gastrointestinal, lymph nodes all normal. They noted the hepatic vasculature was patent. They noted varices. Moderate ascites was seen. And they noted a large umbilical hernia containing some fat and ascites as well. Overall they saw cirrhosis with portal hypertension but they did not see any hepatocellular carcinoma. They also saw pancreatic cystic lesions which they thought could be IPMN's. Will review this at your follow-up.  Not needing any ascites  taps since earlier but chest now that is issues.  Weight June 149 and prior visit was 146 and post tap drops to 140 or so.   1/31/24 healow. SHe went for paracentesis on 1/24/24 and not enough fluid to do She is still on aldactone 200mg and lasix 80 mg They increased her lactulose to 3 times a day 30 mL a day to help with constipation and this is helping.  She pending colonoscpy and they are going to try to work her in . They are going to do a repeat EGD at the same time.   She has a mammogram monday on right breast and us for oltx and did and was ok and not an issue. She is doing SARP current with insurance     12/6/23 healow MELD 22 on 11/28/23 labs  she was at the ER this weekend she had complex migraine she saw dr. andres starting oltx  she is now  200 mg aldactone and 80 mg lasix and takign all in am  she had labs done on 12/3/23 at er and stable cr and lytes  she will do day long visit soon to start tolx process  sh eis still doing paracentesis  She is doing a new counselor this after   11/6/23 telemedicine  11/2/23 labs were sent to me., Hemoglobin 12.3, MCV 98, platelets 54 and this is low. Glucose 140, creatinine 0.56, sodium 135, potassium 4.3, bilirubin 7.6, alkaline phosphatase 122, AST 43, ALT 23. INR 1.4, back up. MELD up at 19, MELD-na 19.   she is using the bathroom more so diuretics may be helping weight 170 and still doing paracentesis every 2 weeks so not supriya to be any different she gains on average 10 lbs between them  still have appt 11/28/23 with dr. andres.    10/26/23 ov SHe has oltx appt on 11/28 she had nathen yesterday and removed 4.6 L Happy to see this.  THis was 9 days after previous, so sooner bc the previous had suction issues so not as much done.  She is shcedule through the end of the year   The 10/23/23 labs were sent to me. The white blood cells low at 3.5 and we will continue to monitor. Red blood cells 3.7, hemoglobin 12, .5 and this is elevated be sure you are taking the B12. Platelets low at 71 and we will continue to monitor. The glucose 114, creatinine 0.62, sodium 138, potassium 4.3, bilirubin 7.5, alkaline phosphatase 122, AST 42, ALT 24, INR 1.3. Bilirubin slightly lower which is good to see. INR down from 1.4 which is also good to see. MELD still up at 17  The 10/11/23 labs were sent to me. The glucose 106 which is elevated if you are fasting. Creatinine 0.7, sodium 137, potassium 4.8, bilirubin 7.9, alkaline phosphatase 116, AST 45, ALT 22. White blood cells low at 3.7, red blood cells 3.5, hemoglobin 12.8, , platelets 66. INR 1.4. The bilirubin slightly lower now as previously it was 8.8. The AST at 45 and we will need to continue to monitor this. MELD 18. Please let us know if you have not been able to get the appointment with [facility] oltx team. We will see what they look like next with with increasing the aldactone in efforts to try to slow need for paracentesis.    recap 10/10/23 ov Dear Lupe Song, Sept 27: Ammonia normal 48 and normal less than 72 umol/L. Would still add the xifaxan as we saw the asterixis clincially and you have the memory lapses and see how you do. Inr 1.3 down from 1.4 from 1.5. Glu 111 elevated from 104. Bun 14 and cr 0.7. Na 134 and k 4.3 and cl 100 and co2 20 and ca 8.5 little low. Albumin 3.3 and little lower and due to the taps and liver disease. Tb 8.8 and down 9.4. Alk 142 and up from 113. Ast 37 and alt 16. Prior ast 35 and alt 16. Wbc 5.3 hg 12 and mcv 100.8 up but little lower. Platelets 70 and down from 81. Neutrophils 3498 and lymphs 1643 normal but monocytes low 106. Meld 18 and meld na 20. Lets see how you do with the diuretics and lets follow the labs and weight trends closely. Called the pt to discuss labs. No side effects from the xifaxan and not seeing a change. Weight 177.2 and 1 pound up from last week. Nothing yet from [facility].  She had paracentesis on 10/3/23 had paracentesis and took off 7.5 L.  She is now 166.  wbc 4.8 red blood cells 3.66 mcv 108.7, platelets 84, 13.4,  inr 1.6,  cr. 0.7, ast 43, alt 19, alp 151, tb 7.2 MELD 19 MELD na 23.   she  is taking the aldactone 50 and lasix 20 mg and we will go up on this  Dr Gee Dear Lupe Song, September 1 ultrasound sent in and it showed a 5.9 L tap. It states specifically they did not do any studies on the fluid as apparently they do not have a standing order lab study protocol requested for this. Again this is one of the limitations of the doing the taps elsewhere. September 22 ultrasound also sent in and 4.7 L was removed and again the fluid was discarded as there was no requested analysis and no standing order protocol to do so. Here they do this on the fluid automatically. That explains why we never received any other fluid studies from there. It is possible that the team there could speak with them to get them to do the studies with the next tap. Dr. Gee recap She had been less jaundiced and she says over the last 5 weeks people commented.  3m off alcohol and send over to the oltx,  Doing therapy with private therapist.  Refer to [facility] as  and not getting better.  Did ct at [facility] and done for pain post tap and that was new and chills and Dr Tolentino and went in and colitis.  They gave her abx and bentyl.   Sept 20 labs show inr little up 1.4 but down from 1.5. Glucose 104 elevated but down from 132. Bun 15 and cr 0.67 and na 135 and k 4.5 and cl 101 and co2 23 and calcium 8.5 and little low and may want to share with primary and look at this and your vitamin d level status. Albumin little lower 3.4 from 3.7 prior. Total bili 9.4 and up from 6.2 and prior 7.3. Alk 113 and ast 35 and alt 16. Prior ast 9 and alt 18. Wbc 3.9 and hg 12.7 and mcv 101.7 elevated. Platelets low 81. Rbc little low 3.47 and mch little up 36.6. Neutrophils 2543 and lymphs 1045 normal. Monocytes little low 191. Meld 19 and meld na 20. This went up from 17 and concerning to see.  She did tap friday and got 4.5 liters off and says did tap on side and not drain as much.  re diuretics she was on lasix 20mg and aldactone once day.  Can go up to bid on those and do labs next week and the following,  telemed in 2 weeks and see doing.  July 31st labs showed your iron sat was elevated at 55%. We may need to do a hemochromatosis panel to follow-up on that to see why it is running high. It could still also be high as well because of the alcohol use previously. Ceruloplasmin was normal at 32. Ferritin was also again up in keeping with iron being up at 440. Hep B immunity was seen at a level of 105. Your alpha-1 screen was normal at MM type. Sugar was up at 132 and higher from July 15 when it was 115. BUN is 16 creatinine 0.65 sodium 135 potassium 4.6 calcium 8.9 albumin 3.7 normal, bilirubin was 6.2 down from 7.3 (not fractionated and need that done next time) and alk phos was 154 from 158 and AST 39 from 36 and ALT of 18 from 14. WBC was 4.2, hemoglobin 12.4 and platelet count low at 67. MCV was 105. INR was elevated at 1.5 with a prothrombin time 17.4. No hep A immunity was seen. You should consider getting the hepatitis A vaccine series electively. Summary: DF 17.2 so less than 32. Meld 18 and meld na 19 so less than 21 which is good to see but would want it to be less than 15 and above that.  Also, as you recall, you had previously in June when back in the hospital had a MELD score that had been less than 21 and a DF score of less than 32. You had lost a substantial amount of weight also between your illness and your diuretics. Despite the high sugar your A1c was reported not to be up. Need to follow trends as more time passes. Treatment is as we discussed supportive and to watch for any issues with your diuretics or labs over time. Continued abstinence is key.  recap:  Patient was last seen by them on July 14, 2023 for a posthospital visit.  She was apparently admitted on June 27, 2023 to a local facility for worsening abdominal distention and edema.  She had a history of alcohol usage. Pt at the time was having several glasses at night for years.  Dr Wills is for women having more 2 drinks a day will lead to cirrhosis over time.  Her father apparently passed away in March and she had been drinking heavily until then.  Alcohol stopped since June 25/26 and has not done any counseling.  Admission labs reported a hemoglobin of 12.2 platelet count 102 INR 1.6 bilirubin 4.3 alk phos 203 AST 69 ALT 20 lipase 97 ethanol level 222. Hepatitis ABC panel was reported negative.  Patient had a MELD score reported to be 17 but do not have a DF score. Severe is alc hep meld over 21. But above the 15 cut off for oltx.  CT of the abdomen showed cirrhotic nodular liver as well as portal pretension and diffuse abdominal pelvic ascites.  June 28, 2023 EGD was reported normal.  June 27,011 and 11 L paracentesis was done on June 29 with 7.5 L paracentesis was removed.  July 18 7.5 L and 4th one this friday.  She is learing to be on low salt diet.  She had a tap tomorrow will be 2 weeks post the last one.  Patient was started on Lasix 20 and Aldactone 50 mg a day and was inc since to 40 and 100mg  Weight is down 75 from admit and part fluid and part not.  Still losing some.  The ascites may be better with the dose inc.  She was given lactulose mostly for constipation per their note and not for encephalopathy issues.  When seen on July 14 she was not on any special diet but had lost about 11 pounds more amd was 251 admit since leaving the hospital.  Weight was noted to be 194.4 pounds at the time with a BMI of 34.43.  Abdomen is reported to be distended then.  Local labs from July 14 showed AFP of 4.6. With a sugar of 115 BUN of 12 creatinine 0.5 sodium 136 potassium 4.7 chloride 103 CO2 of 21 calcium 8.8 albumin diminished at 3.4 bilirubin elevated 7.3 alkaline phosphatase 158 AST of 36 and ALT of 14. WBC 4.2 hemoglobin 13.3 plate count 94 .4 with neutrophils 2617 and lymphocytes 1205. Prothrombin time was listed as 14.4 with normal up to 11.5 seconds there. INR was 1.4.  Df 20.6  Meld 18/19.  Able to review some of the [facility] records directly and saw that on June 28, 2023 Dr. Perkins did the EGD and it was normal with the Z-line at 37 cm and the stomach and duodenum also appeared normal.  The June 27, 2023 admit consult mentions how patient had been complaining about edema for a week and had been using alcohol at least a bottle of wine per day for several years and then had cut back to 3 to 4 glasses a day but then when her father passed in March she was drinking more heavily and came in with the hemoglobin of 12.2 INR 1.6 platelets 102 bilirubin 4.3 alk phos 283 AST 69 ALT 20 lipase 97 and ethanol of 222.  Mention how the CT again has shown the diffuse abdominal pelvic ascites as well as sequela of portal pretension and the cirrhosis.  She never had a colonoscopy. Previous EGD had been 20 years ago.  Other past medical history listed ADHD, alcohol use, anxiety, diabetes, ascites. Jaundice.  A1c 4.4 % without meds.  Alcohol usage again was as listed above. Denied tobacco usage.  CT scan had shown lower thorax with no effusion of the pleural or pericardial area but did a cirrhotic nodular liver with sequela of hypertension including gastroesophageal and splenic varices and moderate to large intra-abdominal ascites. The fluid appeared simple. Gallbladder was unremarkable. No bile duct dilation seen. Spleen was enlarged. Splenic mass. Perisplenic ascites were seen. No significant bowel wall thickening or evidence of free air was seen. Atherosclerotic calcifications were seen in the abdominal aorta and branches but without aneurysm. They felt her MELD score was 17 at the time and they plan for the EGD to be done. July 18, 2023 ultrasound was 7.1 L. June 29, 2020 ultrasound 7.5 L. June 27, 2000 2311 L.  Discharge summary mentions she was admitted from the 27th of the 29th and that she was scheduled to follow-up with Dr. Cruz.  She was a 3m wait so was set up to see u.s.  They had recommended her to be on thiamine and folic acid.  She was kept on her Lasix and Aldactone after her paracentesis treatments. She was monitored for withdrawal and they were not noted. She was kept on her citalopram and Adderall for her anxiety/ADHD/depression.  She was reported to have type 2 diabetes but with an A1c of 4.4 and been off of medicines for 2 years.  Discharge medicines include citalopram 1 and half tablets at night, folic acid 100-day, Lasix 20 mg a day, lactulose 15 mL once a day as needed, Aldactone 50 mg a day and thiamine 100 mg a day.   Plan: 1. Patient is meld 19 now and on list for 7m and post tips stopped the constant taps.  2. Pse little worse prior and better now and lactulsoe and the xifaxan. 3. Pt will see oltx team Feb. 4. Pt will call if issues. 5. See us end of feb.    Duration of visit was 55  minutes with 20 minutes spent prepping chart and loading info from labs and contacts for the visit to [facility] records and then 35 additional minutes for this face to face visit with time spent reviewing chart and events and plans with the pt and in discussing plans.

## 2024-12-02 NOTE — EXAM-PHYSICAL EXAM
Gen: awake and responsive. Mildly icteric sclera   Eyes: mildly icteric, normal lids.   Mouth:Normal lips.   Nose: no drianage.   Hearing: intact grossly.   Neck: trachea midline and no jvd.   CV: RRR no s3.   Lungs: clear. No wheezes,   Abd: Soft, nabs, NR, NT. No appreciable ascites and liver not enlarged and spleen tip trace palpable.   Ext:no sig leg edema, some palm erythema. Multiple spider angiomas.    Neuro: moves all 4 ext grossly. Slight tremor/asterixis.

## 2024-12-18 ENCOUNTER — WEB ENCOUNTER (OUTPATIENT)
Dept: URBAN - METROPOLITAN AREA CLINIC 86 | Facility: CLINIC | Age: 39
End: 2024-12-18

## 2025-03-03 ENCOUNTER — OFFICE VISIT (OUTPATIENT)
Dept: URBAN - METROPOLITAN AREA TELEHEALTH 2 | Facility: TELEHEALTH | Age: 40
End: 2025-03-03

## 2025-03-03 NOTE — HPI-TODAY'S VISIT:
Patient is a 39-year-old female seen Dec 2024 at the referral from Barb Espinosa nurse practitioner and Dr. Antoine Gomez for evaluation of recently noted cirrhosis suspected due to prior alcohol and now s.p olxt dec 2024 and here for post oltx monitoring,  A copy of the note will be sent to the referring provider.  Chart prep: Post tx 2-  xray no acute findings and biliary stent not seen.  Jan 23 2025: ct heart is normal in size and without pericardial effusion and small to mod right pleural effusion with adjacent compressive atelectasis in the right lower lobe. Post oltx seen and non cirrhotic and wedge shaped area hypoenhancement in right lobe seg 5/6 and small adjacent subcapsular fluid collection 5.4 x1.4cm and spleen 16.7cm and no adrenal nodularity and pancreas was atropic without evidence of main pancreatic ductal dilation and gb absent and cbd stent in place. no sig dilaiton and kidneys symmetric and no hydronephrosis and mold atherosclerosis and no ascites. Superficial right mid abdominal sub q fluid collecition incision sca 6.4x4.3x3.4cm and trace umbilical hernia.cystic right ovarian lesions 2.4cm and likely physicolgic ovarian cyst,  feb 20 2025 labs wbc 45.4 and hg 11.8 and hct 36 and plateets 150 and neutrophils 3.2 and lymphs 0.9 and tac 8.5 ggt 22mg 1.6 glu 150 and bun 40 and cr 0.93 and na 134 and k 4.6 and cl 105 and co2 22 and ca 9.1 and tp 7.0 alb 4.4 and ast 23 and alt 15 and alk 71 and tb 0.4   Updated recent [facility]: November 10 labs noted showing glucose 106 BUN is 16 creatinine 0.78 sodium 139, K 2.5 CO2 22 calcium 8.5 albumin 3.2 AST 39 ALT 17 alk phos 126 bilirubin elevated at 5.9.  TSH 2.44 which was up a little bit for her.  Ethyl alcohol screen was negative at less than 10.  INR was 1.14 with a prothrombin time 15.7.  WBC 4.3 hemoglobin 12.8 MCV 94.1 platelet count 99.  Neutrophils 2.8 lymphocytes 1.1.  Magnesium 2.1. She says meld was 19.  November 4 labs show albumin 3.2 AST 42 ALT 17 alk phos 127 bilirubin 5.4.  INR 1.41.  October 8024 labs showed albumin 3.6 AST 46 ALT 20 alk phos 121 bilirubin 5.7.  INR 1.45.  September 10,024 labs show AST 50 ALT 25 alk phos 145 bilirubin 4.4.  INR 1.48.  Patient in the emergency room November 10, 2024 with history of her cirrhosis, diabetes, hyperlipidemia status post TIPS and was noted to have intermittent tremors at that timeframe.  She has been waiting on her transplant.  She has been compliant with her lactulose, Aldactone and Lasix was to see Dr. Andres soon.  Last MELD score was 19.  Current medicines listed were being on citalopram 40 mg half a tablet a day, folic acid 800 mcg a day, furosemide 40 mg once a day.  Lantus 14 units in the morning and 14 at night.  Sliding scale insulin 15 units 3 times a day for blood glucose greater than 140.  Lactulose 30 mL 3 times a day, ondansetron 4 mg twice a day as needed, pantoprazole 40 minutes a day.  Spironolactone 100 mg a day, thiamine 100 once a day and send lasix 20 mg twice a day. xif bid.  Dr Andres thought was pse role. Kept of lactulose and xifaxan. Not to drive.  Weight was listed as being 73.9 kg 163 pounds.  They did a CT of the head with no acute findings. They gave her 1 L fluid bolus x 2 she has been feeling better with that.  No other acute findings were noted so she was discharged to follow-up with Dr. Andres.  CT scan was obtained September 25, 2024 at [facility] that showed lung bases clear, liver cirrhotic with nodular contour and lobar redistribution and no suspicious lesions.  Shunt appears patent. Vessels: The main hepatic vein were patent.  No intrahepatic bile duct dilation.  Small gallstones were seen.  Persistent splenomegaly.  No ascites was seen.  Small gastroesophageal varices seen.  No aneurysm dilation of the aorta is seen.  Small fat-containing umbilical hernia seen.  Cirrhotic liver seen including splenomegaly and small varices.  No lesions.  TIPS patent.  Last tap was right after tips in Aug.  She says meld is at 23 now and she says almost got it in Aug 2nd week and had some bleeding and saw had cut gum and bleeding and factors given.  Tips was done July 1 and successful.   Tapped x 2 and both times less than a liter and prior was once a week getting tapped.  She says sugars off and on insulin.  Last Meld score is running 19 and we suspected they would put in a small tips.  She says tb went from to 3s and now in 8s and this is post tips. Lowest post tips was 4.  Prior diuretics aldactone 200mg and lasix 80mh and then dropped ald 100mg and 40mg lasix and assessing and dosing.  Did egd and added protonix and not oozing and some bleeding.  Aug 29 [facility] note: from Family medicine at [facility]. Cellulitis where monitor was and placed her on keflex.   May 7 labs showed glucose still elevated at 159 but down from 167 in March.  BUN up a little at 37 and creatinine of 1.08 from previous BUN of 24 and creatinine 0.86.  Unclear if you are running a little dry this day. Sodium 129 a little bit lower and potassium upper normal at 5.1.  Previously sodium 132 and potassium 4.1. Chloride 96 is a little low.  CO2 of 24.  Albumin normal at 4.1 and that could be from post albumin infusions potentially or if you are running a little dry?  Bilirubin up to 6.3, alk phos 127 AST 54 and ALT 33 previously bilirubin 5.4 alk phos 140 AST 39 and ALT 24.  May Called pt: She is on Aldactone 100mg 2 am and Lasix 80mg and in p, aldactone 100mg po qpn and 40mg po qpm.  The pm doses were added at [facility] by oltx due to fluid and had need for thoracentesis. She will contact [facility] tomorrow as they were talking re doing the tips and need to consider who to address rising labs with inc med dosing.  Suspect the pm doses likely need to be dropped or stopped and see how does. We will fax the labs tomorrow am to Dr Andres so that they can look at these and decide on best next option given listed now.   Due for ct in July and she did a ct in april and was told was ok.   3/20/24 labs : The glucose 167, this is elevated if you are fasting. Creatinine 0.6, sodium low at 132, calcium slightly low at 8.5, lower limits of normal is 8.6. Albumin 3.3 and this is on follow-up. Bilirubin 7.4, alkaline phosphatase 140, AST 39, ALT 24. The white blood cells 5.2, hemoglobin 13.1, .4 which is elevated need to be sure you are taking your vitamin B12 and folic acid. Platelets 83 and this is low and continue to monitor. INR 1.2. MELD 15, MELD na 19 so lower and continue to monitor and review at the follow visit.  She has been seen and saw them Feb and april 19 at [facility].  Waiting on 6m counseling to finish in May.  3/11/24 MRI was sent to me. The liver without fat or iron. They did see cirrhosis but did not see any focal lesions. The gallbladder/biliary tree with gallstones but they did not see any inflammation. Spleen enlarged at 17 cm. The pancreas with cysts that they thought were most consistent with intraductal papillary mucinous neoplasms or IPMNs and these are benign. Largest was measuring up to 10 mm, we will review this at your follow-up visit and see if we can compare this to any prior imaging to see if we need to take a closer look.  They did not see any ductal dilatation. The adrenal glands, kidneys, gastrointestinal, lymph nodes all normal. They noted the hepatic vasculature was patent. They noted varices. Moderate ascites was seen. And they noted a large umbilical hernia containing some fat and ascites as well. Overall they saw cirrhosis with portal hypertension but they did not see any hepatocellular carcinoma. They also saw pancreatic cystic lesions which they thought could be IPMN's. Will review this at your follow-up.  Not needing any ascites  taps since earlier but chest now that is issues.  Weight June 149 and prior visit was 146 and post tap drops to 140 or so.   1/31/24 sophy. SHe went for paracentesis on 1/24/24 and not enough fluid to do She is still on aldactone 200mg and lasix 80 mg They increased her lactulose to 3 times a day 30 mL a day to help with constipation and this is helping.  She pending colonoscpy and they are going to try to work her in . They are going to do a repeat EGD at the same time.   She has a mammogram monday on right breast and us for oltx and did and was ok and not an issue. She is doing SARP current with insurance     12/6/23 healow MELD 22 on 11/28/23 labs  she was at the ER this weekend she had complex migraine she saw dr. andres starting oltx  she is now  200 mg aldactone and 80 mg lasix and takign all in am  she had labs done on 12/3/23 at er and stable cr and lytes  she will do day long visit soon to start tolx process  sh eis still doing paracentesis  She is doing a new counselor this after   11/6/23 telemedicine  11/2/23 labs were sent to me., Hemoglobin 12.3, MCV 98, platelets 54 and this is low. Glucose 140, creatinine 0.56, sodium 135, potassium 4.3, bilirubin 7.6, alkaline phosphatase 122, AST 43, ALT 23. INR 1.4, back up. MELD up at 19, MELD-na 19.   she is using the bathroom more so diuretics may be helping weight 170 and still doing paracentesis every 2 weeks so not supriya to be any different she gains on average 10 lbs between them  still have appt 11/28/23 with dr. andrse.    10/26/23 ov SHe has oltx appt on 11/28 she had nathen yesterday and removed 4.6 L Happy to see this.  THis was 9 days after previous, so sooner bc the previous had suction issues so not as much done.  She is shcedule through the end of the year   The 10/23/23 labs were sent to me. The white blood cells low at 3.5 and we will continue to monitor. Red blood cells 3.7, hemoglobin 12, .5 and this is elevated be sure you are taking the B12. Platelets low at 71 and we will continue to monitor. The glucose 114, creatinine 0.62, sodium 138, potassium 4.3, bilirubin 7.5, alkaline phosphatase 122, AST 42, ALT 24, INR 1.3. Bilirubin slightly lower which is good to see. INR down from 1.4 which is also good to see. MELD still up at 17  The 10/11/23 labs were sent to me. The glucose 106 which is elevated if you are fasting. Creatinine 0.7, sodium 137, potassium 4.8, bilirubin 7.9, alkaline phosphatase 116, AST 45, ALT 22. White blood cells low at 3.7, red blood cells 3.5, hemoglobin 12.8, , platelets 66. INR 1.4. The bilirubin slightly lower now as previously it was 8.8. The AST at 45 and we will need to continue to monitor this. MELD 18. Please let us know if you have not been able to get the appointment with [facility] oltx team. We will see what they look like next with with increasing the aldactone in efforts to try to slow need for paracentesis.    recap 10/10/23 guerita Dear Lupe Song, Sept 27: Ammonia normal 48 and normal less than 72 umol/L. Would still add the xifaxan as we saw the asterixis clincially and you have the memory lapses and see how you do. Inr 1.3 down from 1.4 from 1.5. Glu 111 elevated from 104. Bun 14 and cr 0.7. Na 134 and k 4.3 and cl 100 and co2 20 and ca 8.5 little low. Albumin 3.3 and little lower and due to the taps and liver disease. Tb 8.8 and down 9.4. Alk 142 and up from 113. Ast 37 and alt 16. Prior ast 35 and alt 16. Wbc 5.3 hg 12 and mcv 100.8 up but little lower. Platelets 70 and down from 81. Neutrophils 3498 and lymphs 1643 normal but monocytes low 106. Meld 18 and meld na 20. Lets see how you do with the diuretics and lets follow the labs and weight trends closely. Called the pt to discuss labs. No side effects from the xifaxan and not seeing a change. Weight 177.2 and 1 pound up from last week. Nothing yet from [facility].  She had paracentesis on 10/3/23 had paracentesis and took off 7.5 L.  She is now 166.  wbc 4.8 red blood cells 3.66 mcv 108.7, platelets 84, 13.4,  inr 1.6,  cr. 0.7, ast 43, alt 19, alp 151, tb 7.2 MELD 19 MELD na 23.   she  is taking the aldactone 50 and lasix 20 mg and we will go up on this  Dr Gee Dear Lupe Song, September 1 ultrasound sent in and it showed a 5.9 L tap. It states specifically they did not do any studies on the fluid as apparently they do not have a standing order lab study protocol requested for this. Again this is one of the limitations of the doing the taps elsewhere. September 22 ultrasound also sent in and 4.7 L was removed and again the fluid was discarded as there was no requested analysis and no standing order protocol to do so. Here they do this on the fluid automatically. That explains why we never received any other fluid studies from there. It is possible that the team there could speak with them to get them to do the studies with the next tap. Dr. Gee recap She had been less jaundiced and she says over the last 5 weeks people commented.  3m off alcohol and send over to the oltx,  Doing therapy with private therapist.  Refer to [facility] as 3m and not getting better.  Did ct at [facility] and done for pain post tap and that was new and chills and Dr Tolentino and went in and colitis.  They gave her abx and bentyl.   Sept 20 labs show inr little up 1.4 but down from 1.5. Glucose 104 elevated but down from 132. Bun 15 and cr 0.67 and na 135 and k 4.5 and cl 101 and co2 23 and calcium 8.5 and little low and may want to share with primary and look at this and your vitamin d level status. Albumin little lower 3.4 from 3.7 prior. Total bili 9.4 and up from 6.2 and prior 7.3. Alk 113 and ast 35 and alt 16. Prior ast 9 and alt 18. Wbc 3.9 and hg 12.7 and mcv 101.7 elevated. Platelets low 81. Rbc little low 3.47 and mch little up 36.6. Neutrophils 2543 and lymphs 1045 normal. Monocytes little low 191. Meld 19 and meld na 20. This went up from 17 and concerning to see.  She did tap friday and got 4.5 liters off and says did tap on side and not drain as much.  re diuretics she was on lasix 20mg and aldactone once day.  Can go up to bid on those and do labs next week and the following,  telemed in 2 weeks and see doing.  July 31st labs showed your iron sat was elevated at 55%. We may need to do a hemochromatosis panel to follow-up on that to see why it is running high. It could still also be high as well because of the alcohol use previously. Ceruloplasmin was normal at 32. Ferritin was also again up in keeping with iron being up at 440. Hep B immunity was seen at a level of 105. Your alpha-1 screen was normal at MM type. Sugar was up at 132 and higher from July 15 when it was 115. BUN is 16 creatinine 0.65 sodium 135 potassium 4.6 calcium 8.9 albumin 3.7 normal, bilirubin was 6.2 down from 7.3 (not fractionated and need that done next time) and alk phos was 154 from 158 and AST 39 from 36 and ALT of 18 from 14. WBC was 4.2, hemoglobin 12.4 and platelet count low at 67. MCV was 105. INR was elevated at 1.5 with a prothrombin time 17.4. No hep A immunity was seen. You should consider getting the hepatitis A vaccine series electively. Summary: DF 17.2 so less than 32. Meld 18 and meld na 19 so less than 21 which is good to see but would want it to be less than 15 and above that.  Also, as you recall, you had previously in June when back in the hospital had a MELD score that had been less than 21 and a DF score of less than 32. You had lost a substantial amount of weight also between your illness and your diuretics. Despite the high sugar your A1c was reported not to be up. Need to follow trends as more time passes. Treatment is as we discussed supportive and to watch for any issues with your diuretics or labs over time. Continued abstinence is key.  recap:  Patient was last seen by them on July 14, 2023 for a posthospital visit.  She was apparently admitted on June 27, 2023 to a local facility for worsening abdominal distention and edema.  She had a history of alcohol usage. Pt at the time was having several glasses at night for years.  Dr Wills is for women having more 2 drinks a day will lead to cirrhosis over time.  Her father apparently passed away in March and she had been drinking heavily until then.  Alcohol stopped since June 25/26 and has not done any counseling.  Admission labs reported a hemoglobin of 12.2 platelet count 102 INR 1.6 bilirubin 4.3 alk phos 203 AST 69 ALT 20 lipase 97 ethanol level 222. Hepatitis ABC panel was reported negative.  Patient had a MELD score reported to be 17 but do not have a DF score. Severe is alc hep meld over 21. But above the 15 cut off for oltx.  CT of the abdomen showed cirrhotic nodular liver as well as portal pretension and diffuse abdominal pelvic ascites.  June 28, 2023 EGD was reported normal.  Etta 27,011 and 11 L paracentesis was done on June 29 with 7.5 L paracentesis was removed.  July 18 7.5 L and 4th one this friday.  She is learing to be on low salt diet.  She had a tap tomorrow will be 2 weeks post the last one.  Patient was started on Lasix 20 and Aldactone 50 mg a day and was inc since to 40 and 100mg  Weight is down 75 from admit and part fluid and part not.  Still losing some.  The ascites may be better with the dose inc.  She was given lactulose mostly for constipation per their note and not for encephalopathy issues.  When seen on July 14 she was not on any special diet but had lost about 11 pounds more amd was 251 admit since leaving the hospital.  Weight was noted to be 194.4 pounds at the time with a BMI of 34.43.  Abdomen is reported to be distended then.  Local labs from July 14 showed AFP of 4.6. With a sugar of 115 BUN of 12 creatinine 0.5 sodium 136 potassium 4.7 chloride 103 CO2 of 21 calcium 8.8 albumin diminished at 3.4 bilirubin elevated 7.3 alkaline phosphatase 158 AST of 36 and ALT of 14. WBC 4.2 hemoglobin 13.3 plate count 94 .4 with neutrophils 2617 and lymphocytes 1205. Prothrombin time was listed as 14.4 with normal up to 11.5 seconds there. INR was 1.4.  Df 20.6  Meld 18/19.  Able to review some of the [facility] records directly and saw that on June 28, 2023 Dr. Perkins did the EGD and it was normal with the Z-line at 37 cm and the stomach and duodenum also appeared normal.  The June 27, 2023 admit consult mentions how patient had been complaining about edema for a week and had been using alcohol at least a bottle of wine per day for several years and then had cut back to 3 to 4 glasses a day but then when her father passed in March she was drinking more heavily and came in with the hemoglobin of 12.2 INR 1.6 platelets 102 bilirubin 4.3 alk phos 283 AST 69 ALT 20 lipase 97 and ethanol of 222.  Mention how the CT again has shown the diffuse abdominal pelvic ascites as well as sequela of portal pretension and the cirrhosis.  She never had a colonoscopy. Previous EGD had been 20 years ago.  Other past medical history listed ADHD, alcohol use, anxiety, diabetes, ascites. Jaundice.  A1c 4.4 % without meds.  Alcohol usage again was as listed above. Denied tobacco usage.  CT scan had shown lower thorax with no effusion of the pleural or pericardial area but did a cirrhotic nodular liver with sequela of hypertension including gastroesophageal and splenic varices and moderate to large intra-abdominal ascites. The fluid appeared simple. Gallbladder was unremarkable. No bile duct dilation seen. Spleen was enlarged. Splenic mass. Perisplenic ascites were seen. No significant bowel wall thickening or evidence of free air was seen. Atherosclerotic calcifications were seen in the abdominal aorta and branches but without aneurysm. They felt her MELD score was 17 at the time and they plan for the EGD to be done. July 18, 2023 ultrasound was 7.1 L. June 29, 2020 ultrasound 7.5 L. June 27, 2000 2311 L.  Discharge summary mentions she was admitted from the 27th of the 29th and that she was scheduled to follow-up with Dr. Cruz.  She was a 3m wait so was set up to see u.s.  They had recommended her to be on thiamine and folic acid.  She was kept on her Lasix and Aldactone after her paracentesis treatments. She was monitored for withdrawal and they were not noted. She was kept on her citalopram and Adderall for her anxiety/ADHD/depression.  She was reported to have type 2 diabetes but with an A1c of 4.4 and been off of medicines for 2 years.  Discharge medicines include citalopram 1 and half tablets at night, folic acid 100-day, Lasix 20 mg a day, lactulose 15 mL once a day as needed, Aldactone 50 mg a day and thiamine 100 mg a day.   Plan: 1. Patient is meld 19 now and on list for 7m and post tips stopped the constant taps.  2. Pse little worse prior and better now and lactulsoe and the xifaxan. 3. Pt will see oltx team Feb. 4. Pt will call if issues. 5. See us end of feb.    Duration of visit was   minutes with 30 minutes spent prepping chart and loading info from labs and contacts for the visit to [facility] records and then 20 additional minutes for this face to face visit with time spent reviewing chart and events and plans with the pt and in discussing plans.

## 2025-03-30 ENCOUNTER — TELEPHONE ENCOUNTER (OUTPATIENT)
Dept: URBAN - METROPOLITAN AREA CLINIC 86 | Facility: CLINIC | Age: 40
End: 2025-03-30

## 2025-03-30 NOTE — HPI-TODAY'S VISIT:
This Patient's last ultrasound paracentesis look was back on December 27, 2024 and no significant free fluid was seen.   This had been set up because the patient called on [place] [facility] 2024 complaining of 8 pounds of weight gain in 24 hours and due to the history of the transplant 10 days before in December 15, 2024 there were concerns of same.   Her creatinine had been trending down to 1.22 from 1.76 sodium had been 131.  She took the Lasix 40 once that day at the recommendation of Maria De Jesus RAMIREZ at [facility].   [facility] discharge summary from December 14 through December 21 was reviewed as the patient had gone in for the liver transplant due to suspected prior alcohol and OHARA components given her diabetes, obesity and polycystic ovary.   She underwent a transplant with Dr. Zuniga with stent and is a DCD donor with MPR.  She had a primary umbilical hernia repair done by Dr. Waldrop.  Donor was positive for hep B surface antibody and B core and hep C antibody positive, syphilis positive toxoplasma positive.  No malignancy on explant noted.  Patient responded well postoperatively and she had some acute kidney injury though noted and transplant nephrology was seen and a trial of Bumex was given.  She had hypokalemia.  She then improved.  She was maintained on Prograf and CellCept.   She was started on tenofovir 25 once a day due to her renal sufficiency but the hep B core positive status to prevent reactivation.  Donor was set to screen reactive so she was to receive pen G2 400,000 units IM weekly for 3 doses due on December 16, December 23 and December 30.  Daughter was toxo positive recipient was toxo IgG negative.   Patient was noted to have some steroid-induced hyperglycemia and endocrine had to help adjust accordingly.   Coreg was started for renovascular hypertension.   Discharge medicines included carvedilol 6.25 mg twice a day, Colace 100 mg a day, epinephrine 0.3 mL as needed, famotidine 20 mg a day, multivitamin with folic acid every day, mycophenolate 500 mg twice a day, oxycodone 5 mg every 4 hours Samantha, pen G x 3 doses, MiraLAX 17 g a day.  Prednisone 5 mg a day.  Sodium bicarbonate 653 times a day, hep C treatment with Epclusa was started for hep C donor positive protocol.  Bactrim also was started every other day.  Tacrolimus was 2 mg twice a day.  Vemlidy 25 mg a day.  Valganciclovir 150 mg once a day.  As well as sliding scale insulin.  Also still on citalopram 20 mg a day.   More recent labs from March 27, 2025 showed WBC 2.26 which has been trending up from a recent level of 1.8 on March 6.  Hemoglobin 10.8 hematocrit 32.7 MCV 84 and plate count 110.  Glucose 116 BUN 32 creatinine 1.05 sodium 137 potassium 5.1 chloride 105 CO2 23 calcium 9 albumin 4.2 AST 24 ALT 13 alk phos 99 bilirubin 0.2 magnesium was 1.7 tacrolimus level was 6.6   Regarding her hepatitis C she was genotype Ia on December 18 from the donor and was noted to be hep C PCR negative uncover 27th and March 27 checks.   January 30, 2025 A1c was 3.8.   [facility] CT of the abdomen done January 23, 2025:     1.  Postsurgical changes of liver transplant. The liver transplant graft   is noncirrhotic in morphology.   2.  Peripheral wedge-shaped area of hypoenhancement within the right   hepatic lobe, segment 5/8. This is nonspecific and could represent a   small hepatic infarct. Adjacent small subcapsular fluid collection along   the anterior right hepatic dome measuring approximately 5.4 x 1.4 cm   suggestive of a small subcapsular hematoma. Further evaluation with   dedicated CT liver protocol without and with contrast may be considered   for further characterization of these findings as clinically warranted.   3.  Superficial right mid abdominal subcutaneous fluid collection   underlying the incision scar measuring 6.4 x 4.3 x 3.4 cm. This is   favored to represent a postoperative seroma or hematoma. Superimposed   infection is difficult to exclude in the appropriate clinical setting.   4.  Small to moderate right pleural effusion with adjacent compressive   atelectasis in the right lower lobe.   5.  Splenomegaly.   6.  Cystic appearing right ovarian lesion measuring 2.4 cm is   statistically favored to represent a physiologic ovarian cyst.   7.  Common bile duct stent remains in place, adequately positioned    January 8, 2025 ultrasound guided right thoracentesis of 1.25 L was done at [facility].   Jan 2 ct chest:     Chest:  No significant coronary artery calcifications. The   cardiovascular structures are within normal limits.  There is no   pericardial effusion.  No mediastinal masses or significant adenopathy   is appreciated. Increasing size of right-sided pleural effusion with   underlying atelectasis. No pneumothorax. The central airways are patent.    The lungs are expanded and the lung parenchyma is clear. No suspicious   nodules are demonstrated.      There are no suspicious lytic or blastic bone lesions.     Limited images through the upper abdomen show the spleen is enlarged.   Some heterogeneity in the right hepatic lobe. Partially imaged. Likely   related to transplant. Small amount of intra-abdominal ascites    February 28, 2025 transplant clinic visit with Dr. Dallas Sanders shows that the patient again had been transplanted with the above findings.  They were doing the steroid taper.  She is maintained on Valcyte for CMV intermediate risk.  I been treated with 3 doses of her NG for the syphilis a potential exposure.  Still with some incisional pain.  BMI was 29.05 weight of 164.   They plan to reduce the tacrolimus by 1 with a goal of 6-8.  She had improved her hyperglycemia from the steroids.  She started her hep C treatment December 31, 2024 and had gone undetectable.  Remain on her Vemlidy for the hep B core positive status.   To be seen again April 14,2025 by Dr. Andres.

## 2025-04-02 ENCOUNTER — OFFICE VISIT (OUTPATIENT)
Dept: URBAN - METROPOLITAN AREA TELEHEALTH 2 | Facility: TELEHEALTH | Age: 40
End: 2025-04-02
Payer: COMMERCIAL

## 2025-04-02 DIAGNOSIS — D49.0 IPMN (INTRADUCTAL PAPILLARY MUCINOUS NEOPLASM): ICD-10-CM

## 2025-04-02 DIAGNOSIS — Z79.899 HIGH RISK MEDICATION USE: ICD-10-CM

## 2025-04-02 DIAGNOSIS — K70.31 ALCOHOLIC CIRRHOSIS OF LIVER WITH ASCITES: ICD-10-CM

## 2025-04-02 DIAGNOSIS — K76.82 HEPATIC ENCEPHALOPATHY: ICD-10-CM

## 2025-04-02 DIAGNOSIS — E11.9 DIABETES: ICD-10-CM

## 2025-04-02 DIAGNOSIS — S92.909A FOOT FRACTURE: ICD-10-CM

## 2025-04-02 DIAGNOSIS — F41.9 ANXIETY AND DEPRESSION: ICD-10-CM

## 2025-04-02 DIAGNOSIS — F10.91 ALCOHOL USE DISORDER IN REMISSION: ICD-10-CM

## 2025-04-02 DIAGNOSIS — Z71.85 VACCINE COUNSELING: ICD-10-CM

## 2025-04-02 DIAGNOSIS — Z94.4 LIVER TRANSPLANT STATUS: ICD-10-CM

## 2025-04-02 DIAGNOSIS — D89.9 IMMUNOSUPPRESSION: ICD-10-CM

## 2025-04-02 DIAGNOSIS — F90.9 ADHD: ICD-10-CM

## 2025-04-02 DIAGNOSIS — B18.2 CHRONIC HEPATITIS C: ICD-10-CM

## 2025-04-02 DIAGNOSIS — K92.2 GI BLEED: ICD-10-CM

## 2025-04-02 DIAGNOSIS — K58.9 IRRITABLE BOWEL SYNDROME: ICD-10-CM

## 2025-04-02 PROCEDURE — 99215 OFFICE O/P EST HI 40 MIN: CPT

## 2025-04-02 RX ORDER — CITALOPRAM 20 MG/1
1 TABLET TABLET, FILM COATED ORAL ONCE A DAY
Status: ACTIVE | COMMUNITY

## 2025-04-02 RX ORDER — SODIUM BICARBONATE TAB 650 MG 650 MG
AS DIRECTED TAB ORAL
Status: DISCONTINUED | COMMUNITY

## 2025-04-02 RX ORDER — OXYCODONE HYDROCHLORIDE 5 MG/1
1 TABLET AS NEEDED TABLET ORAL
Status: DISCONTINUED | COMMUNITY

## 2025-04-02 RX ORDER — SULFAMETHOXAZOLE AND TRIMETHOPRIM 800; 160 MG/1; MG/1
1 TABLET TABLET ORAL EVERY OTHER DAY
Status: ACTIVE | COMMUNITY

## 2025-04-02 RX ORDER — FAMOTIDINE 20 MG/1
1 TABLET AT BEDTIME AS NEEDED TABLET, FILM COATED ORAL ONCE A DAY
Status: ACTIVE | COMMUNITY

## 2025-04-02 RX ORDER — TENOFOVIR ALAFENAMIDE 25 MG/1
1 TABLET WITH FOOD TABLET ORAL ONCE A DAY
Status: ACTIVE | COMMUNITY

## 2025-04-02 RX ORDER — VELPATASVIR AND SOFOSBUVIR 100; 400 MG/1; MG/1
1 TABLET TABLET, FILM COATED ORAL ONCE A DAY
Status: DISCONTINUED | COMMUNITY

## 2025-04-02 RX ORDER — CARVEDILOL 6.25 MG/1
1 TABLET WITH FOOD TABLET, FILM COATED ORAL TWICE A DAY
Status: ACTIVE | COMMUNITY

## 2025-04-02 RX ORDER — VALGANCICLOVIR 450 MG/1
1 TABLET WITH A MEAL TABLET, FILM COATED ORAL ONCE A DAY
Status: DISCONTINUED | COMMUNITY

## 2025-04-02 RX ORDER — PREDNISOLONE 5 MG/1
1 TABLET IN THE MORNING WITH FOOD OR MILK TABLET ORAL ONCE A DAY
Status: DISCONTINUED | COMMUNITY

## 2025-04-02 RX ORDER — POLYETHYLENE GLYCOL 3350 17 G/DOSE
1 SCOOP MIXED WITH 8 OUNCES OF FLUID POWDER (GRAM) ORAL ONCE A DAY
Status: DISCONTINUED | COMMUNITY

## 2025-04-02 RX ORDER — TACROLIMUS 1 MG/1
3 CAPSULE ORAL TWICE A DAY
Status: ACTIVE | COMMUNITY

## 2025-04-02 RX ORDER — DOCUSATE SODIUM 100 MG/1
1 CAPSULE AS NEEDED CAPSULE ORAL ONCE A DAY
Status: ON HOLD | COMMUNITY

## 2025-04-02 RX ORDER — MYCOPHENOLATE MOFETIL 500 MG/1
2 TABLET ORAL TWICE A DAY
Status: ACTIVE | COMMUNITY

## 2025-04-02 NOTE — HPI-TODAY'S VISIT:
Patient is a 40 year-old female seen Dec 2024 at the referral from Barb Espinosa nurse practitioner and Dr. Antoine Gomez for evaluation of recently noted cirrhosis suspected due to prior alcohol and  s.p oltx dec 2024 and here for post oltx monitoring,  A copy of the note will be sent to the referring provider.  Chart prep: Patient's last ultrasound paracentesis look was back on December 27, 2024 and no significant free fluid was seen.'  She did have a thoracentesis in Jan and not since.  She says fluid overload in cells and not drainable and took diuretics to get 30 pounds.  Weight 160.  [facility]:  Hubertus Day 2024 complaining of 8 pounds of weight gain in 24 hours and due to the history of the transplant 10 days before in December 15, 2024 there were concerns of same.    Her creatinine had been trending down to 1.22 from 1.76 sodium had been 131.  She took the Lasix 40 once that day at the recommendation of Maria De Jesus RAMIREZ at [facility].    [facility] discharge summary from December 14 through December 21 was reviewed as the patient had gone in for the liver transplant due to suspected prior alcohol and OHARA components given her diabetes, obesity and polycystic ovary.    She underwent a transplant with Duct to Duct with stent and it passed already and a DCD donor with MPR.  She had a primary umbilical hernia repair done by Dr. Waldrop.    Donor was positive for hep B surface antibody and B core and hep C antibody positive, syphilis positive toxoplasma positive.  No malignancy on explant noted.    Patient responded well postoperatively and she had some acute kidney injury though noted and transplant nephrology was seen and a trial of Bumex was given.  She had hypokalemia.  She then improved.  She was maintained on Prograf and CellCept.    She was started on tenofovir 25 once a day due to her renal sufficiency but the hep B core positive status to prevent reactivation.   Donor was set to screen reactive so she was to receive pen G2 400,000 units IM weekly for 3 doses due on December 16, December 23 and December 30.    She was was donor toxo positive recipient was toxo IgG negative.    Patient was noted to have some steroid-induced hyperglycemia and endocrine had to help adjust accordingly.    Coreg was started for renovascular hypertension.    Discharge medicines included carvedilol 6.25 mg twice a day, Colace 100 mg a day, epinephrine 0.3 mL as needed, famotidine 20 mg a day, multivitamin with folic acid every day, mycophenolate 500 mg twice a day, oxycodone 5 mg every 4 hours Samantha, pen G x 3 doses, MiraLAX 17 g a day.  Prednisone 5 mg a day.  Sodium bicarbonate 653 times a day, hep C treatment with Epclusa was started for hep C donor positive protocol.  Bactrim also was started every other day.  Tacrolimus was 2 mg twice a day.  Vemlidy 25 mg a day.  Valganciclovir 150 mg once a day.  As well as sliding scale insulin.  Also still on citalopram 20 mg a day.    More recent labs from March 27, 2025 showed WBC 2.26 which has been trending up from a recent level of 1.8 on March 6.  Hemoglobin 10.8 hematocrit 32.7 MCV 84 and platelet count 110.  Glucose 116 BUN 32 creatinine 1.05 sodium 137 potassium 5.1 chloride 105 CO2 23 calcium 9 albumin 4.2 AST 24 ALT 13 alk phos 99 bilirubin 0.2 magnesium was 1.7 tacrolimus level was 6.6    Regarding her hepatitis C she was genotype Ia on December 18 from the donor and was noted to be hep C PCR negative Jan 27th and March 27 checks. She just ended the epclusa and key 1m and 3m.    January 30, 2025 A1c was 3.8.    [facility] CT of the abdomen done January 23, 2025:     1.  Postsurgical changes of liver transplant. The liver transplant graft   is noncirrhotic in morphology.   2.  Peripheral wedge-shaped area of hypoenhancement within the right   hepatic lobe, segment 5/8. This is nonspecific and could represent a   small hepatic infarct. Adjacent small subcapsular fluid collection along   the anterior right hepatic dome measuring approximately 5.4 x 1.4 cm   suggestive of a small subcapsular hematoma. Further evaluation with   dedicated CT liver protocol without and with contrast may be considered   for further characterization of these findings as clinically warranted.   3.  Superficial right mid abdominal subcutaneous fluid collection   underlying the incision scar measuring 6.4 x 4.3 x 3.4 cm. This is   favored to represent a postoperative seroma or hematoma. Superimposed   infection is difficult to exclude in the appropriate clinical setting.   4.  Small to moderate right pleural effusion with adjacent compressive   atelectasis in the right lower lobe.   5.  Splenomegaly.   6.  Cystic appearing right ovarian lesion measuring 2.4 cm is   statistically favored to represent a physiologic ovarian cyst.   7.  Common bile duct stent remains in place, adequately positioned    January 8, 2025 ultrasound guided right thoracentesis of 1.25 L was done at [facility].   Jan 2 ct chest:     Chest:  No significant coronary artery calcifications. The   cardiovascular structures are within normal limits.  There is no   pericardial effusion.  No mediastinal masses or significant adenopathy   is appreciated. Increasing size of right-sided pleural effusion with   underlying atelectasis. No pneumothorax. The central airways are patent.    The lungs are expanded and the lung parenchyma is clear. No suspicious   nodules are demonstrated.      There are no suspicious lytic or blastic bone lesions.     Limited images through the upper abdomen show the spleen is enlarged.   Some heterogeneity in the right hepatic lobe. Partially imaged. Likely   related to transplant. Small amount of intra-abdominal ascites   She says it has been getting smaller.    February 28, 2025 transplant clinic visit with Dr. Dallas Sanders shows that the patient again had been transplanted with the above findings.  They were doing the steroid taper.  She is maintained on Valcyte for CMV intermediate risk.  I been treated with 3 doses of her PCN for the syphilis a potential exposure.  Still with some incisional pain.  BMI was 29.05 weight of 164 (160 in april_    They plan to reduce the tacrolimus by 1 with a goal of 6-8.  She had improved her hyperglycemia from the steroids.  She started her hep C treatment December 31, 2024 and had gone undetectable.  Remain on her Vemlidy for the hep B core positive status.    To be seen again April 14,2025 by Dr. Andres.  Post tx 2-  xray no acute findings and biliary stent not seen.  Jan 23 2025: ct heart is normal in size and without pericardial effusion and small to mod right pleural effusion with adjacent compressive atelectasis in the right lower lobe. Post oltx seen and non cirrhotic and wedge shaped area hypoenhancement in right lobe seg 5/6 and small adjacent subcapsular fluid collection 5.4 x1.4cm and spleen 16.7cm and no adrenal nodularity and pancreas was atropic without evidence of main pancreatic ductal dilation and gb absent and cbd stent in place. no sig dilaiton and kidneys symmetric and no hydronephrosis and mold atherosclerosis and no ascites. Superficial right mid abdominal sub q fluid collecition incision sca 6.4x4.3x3.4cm and trace umbilical hernia.cystic right ovarian lesions 2.4cm and likely physicolgic ovarian cyst,  feb 20 2025 labs wbc 45.4 and hg 11.8 and hct 36 and plateets 150 and neutrophils 3.2 and lymphs 0.9 and tac 8.5 ggt 22mg 1.6 glu 150 and bun 40 and cr 0.93 and na 134 and k 4.6 and cl 105 and co2 22 and ca 9.1 and tp 7.0 alb 4.4 and ast 23 and alt 15 and alk 71 and tb 0.4    November 10 labs noted showing glucose 106 BUN is 16 creatinine 0.78 sodium 139, K 2.5 CO2 22 calcium 8.5 albumin 3.2 AST 39 ALT 17 alk phos 126 bilirubin elevated at 5.9.  TSH 2.44 which was up a little bit for her.  Ethyl alcohol screen was negative at less than 10.  INR was 1.14 with a prothrombin time 15.7.  WBC 4.3 hemoglobin 12.8 MCV 94.1 platelet count 99.  Neutrophils 2.8 lymphocytes 1.1.  Magnesium 2.1. She says meld was 19.  November 4 labs show albumin 3.2 AST 42 ALT 17 alk phos 127 bilirubin 5.4.  INR 1.41.  October 8 2024 labs showed albumin 3.6 AST 46 ALT 20 alk phos 121 bilirubin 5.7.  INR 1.45.  September 10,024 labs show AST 50 ALT 25 alk phos 145 bilirubin 4.4.  INR 1.48.  Patient in the emergency room November 10, 2024 with history of her cirrhosis, diabetes, hyperlipidemia status post TIPS and was noted to have intermittent tremors at that timeframe.  She has been waiting on her transplant.  She has been compliant with her lactulose, Aldactone and Lasix was to see Dr. Andres soon.  Last MELD score was 19.  Current medicines listed were being on citalopram 40 mg half a tablet a day, folic acid 800 mcg a day, furosemide 40 mg once a day.  Lantus 14 units in the morning and 14 at night.  Sliding scale insulin 15 units 3 times a day for blood glucose greater than 140.  Lactulose 30 mL 3 times a day, ondansetron 4 mg twice a day as needed, pantoprazole 40 minutes a day.  Spironolactone 100 mg a day, thiamine 100 once a day and send lasix 20 mg twice a day. xif bid.  Dr Andres thought was pse role. Kept of lactulose and xifaxan. Not to drive.  Weight was listed as being 73.9 kg 163 pounds.  They did a CT of the head with no acute findings. They gave her 1 L fluid bolus x 2 she has been feeling better with that.  No other acute findings were noted so she was discharged to follow-up with Dr. Andres.  CT scan was obtained September 25, 2024 at [facility] that showed lung bases clear, liver cirrhotic with nodular contour and lobar redistribution and no suspicious lesions.  Shunt appears patent. Vessels: The main hepatic vein were patent.  No intrahepatic bile duct dilation.  Small gallstones were seen.  Persistent splenomegaly.  No ascites was seen.  Small gastroesophageal varices seen.  No aneurysm dilation of the aorta is seen.  Small fat-containing umbilical hernia seen.  Cirrhotic liver seen including splenomegaly and small varices.  No lesions.  TIPS patent.  Last tap was right after tips in Aug.  She says meld is at 23 now and she says almost got it in Aug 2nd week and had some bleeding and saw had cut gum and bleeding and factors given.  Tips was done July 1 and successful.   Tapped x 2 and both times less than a liter and prior was once a week getting tapped.  She says sugars off and on insulin.  Last Meld score is running 19 and we suspected they would put in a small tips.  She says tb went from to 3s and now in 8s and this is post tips. Lowest post tips was 4.  Prior diuretics aldactone 200mg and lasix 80mh and then dropped ald 100mg and 40mg lasix and assessing and dosing.  Did egd and added protonix and not oozing and some bleeding.  Aug 29 [facility] [place] [facility] [place] Cellulitis where monitor was and placed her on keflex.   May 7 labs showed glucose still elevated at 159 but down from 167 in March.  BUN up a little at 37 and creatinine of 1.08 from previous BUN of 24 and creatinine 0.86.  Unclear if you are running a little dry this day. Sodium 129 a little bit lower and potassium upper normal at 5.1.  Previously sodium 132 and potassium 4.1. Chloride 96 is a little low.  CO2 of 24.  Albumin normal at 4.1 and that could be from post albumin infusions potentially or if you are running a little dry?  Bilirubin up to 6.3, alk phos 127 AST 54 and ALT 33 previously bilirubin 5.4 alk phos 140 AST 39 and ALT 24.  May Called pt: She is on Aldactone 100mg 2 am and Lasix 80mg and in p, aldactone 100mg po qpn and 40mg po qpm.  The pm doses were added at [facility] by oltx due to fluid and had need for thoracentesis. She will contact [facility] tomorrow as they were talking re doing the tips and need to consider who to address rising labs with inc med dosing.  Suspect the pm doses likely need to be dropped or stopped and see how does. We will fax the labs tomorrow am to Dr Andres so that they can look at these and decide on best next option given listed now.   Due for ct in July and she did a ct in april and was told was ok.   3/20/24 labs : The glucose 167, this is elevated if you are fasting. Creatinine 0.6, sodium low at 132, calcium slightly low at 8.5, lower limits of normal is 8.6. Albumin 3.3 and this is on follow-up. Bilirubin 7.4, alkaline phosphatase 140, AST 39, ALT 24. The white blood cells 5.2, hemoglobin 13.1, .4 which is elevated need to be sure you are taking your vitamin B12 and folic acid. Platelets 83 and this is low and continue to monitor. INR 1.2. MELD 15, MELD na 19 so lower and continue to monitor and review at the follow visit.  She has been seen and saw them Feb and april 19 at [facility].  Waiting on 6m counseling to finish in May.  3/11/24 MRI was sent to me. The liver without fat or iron. They did see cirrhosis but did not see any focal lesions. The gallbladder/biliary tree with gallstones but they did not see any inflammation. Spleen enlarged at 17 cm. The pancreas with cysts that they thought were most consistent with intraductal papillary mucinous neoplasms or IPMNs and these are benign. Largest was measuring up to 10 mm, we will review this at your follow-up visit and see if we can compare this to any prior imaging to see if we need to take a closer look.  They did not see any ductal dilatation. The adrenal glands, kidneys, gastrointestinal, lymph nodes all normal. They noted the hepatic vasculature was patent. They noted varices. Moderate ascites was seen. And they noted a large umbilical hernia containing some fat and ascites as well. Overall they saw cirrhosis with portal hypertension but they did not see any hepatocellular carcinoma. They also saw pancreatic cystic lesions which they thought could be IPMN's. Will review this at your follow-up.  Not needing any ascites  taps since earlier but chest now that is issues.  Weight June 149 and prior visit was 146 and post tap drops to 140 or so.   1/31/24 sophy. SHe went for paracentesis on 1/24/24 and not enough fluid to do She is still on aldactone 200mg and lasix 80 mg They increased her lactulose to 3 times a day 30 mL a day to help with constipation and this is helping.  She pending colonoscpy and they are going to try to work her in . They are going to do a repeat EGD at the same time.   She has a mammogram monday on right breast and us for oltx and did and was ok and not an issue. She is doing SARP current with insurance     12/6/23 healow MELD 22 on 11/28/23 labs  she was at the ER this weekend she had complex migraine she saw dr. andres starting oltx  she is now  200 mg aldactone and 80 mg lasix and takign all in am  she had labs done on 12/3/23 at er and stable cr and lytes  she will do day long visit soon to start tolx process  sh eis still doing paracentesis  She is doing a new counselor this after   11/6/23 telemedicine  11/2/23 labs were sent to me., Hemoglobin 12.3, MCV 98, platelets 54 and this is low. Glucose 140, creatinine 0.56, sodium 135, potassium 4.3, bilirubin 7.6, alkaline phosphatase 122, AST 43, ALT 23. INR 1.4, back up. MELD up at 19, MELD-na 19.   she is using the bathroom more so diuretics may be helping weight 170 and still doing paracentesis every 2 weeks so not supriya to be any different she gains on average 10 lbs between them  still have appt 11/28/23 with dr. andres.    10/26/23 ov SHe has oltx appt on 11/28 she had nathen yesterday and removed 4.6 L Happy to see this.  THis was 9 days after previous, so sooner bc the previous had suction issues so not as much done.  She is shcedule through the end of the year   The 10/23/23 labs were sent to me. The white blood cells low at 3.5 and we will continue to monitor. Red blood cells 3.7, hemoglobin 12, .5 and this is elevated be sure you are taking the B12. Platelets low at 71 and we will continue to monitor. The glucose 114, creatinine 0.62, sodium 138, potassium 4.3, bilirubin 7.5, alkaline phosphatase 122, AST 42, ALT 24, INR 1.3. Bilirubin slightly lower which is good to see. INR down from 1.4 which is also good to see. MELD still up at 17  The 10/11/23 labs were sent to me. The glucose 106 which is elevated if you are fasting. Creatinine 0.7, sodium 137, potassium 4.8, bilirubin 7.9, alkaline phosphatase 116, AST 45, ALT 22. White blood cells low at 3.7, red blood cells 3.5, hemoglobin 12.8, , platelets 66. INR 1.4. The bilirubin slightly lower now as previously it was 8.8. The AST at 45 and we will need to continue to monitor this. MELD 18. Please let us know if you have not been able to get the appointment with [facility] oltx team. We will see what they look like next with with increasing the aldactone in efforts to try to slow need for paracentesis.    recap 10/10/23 ov Dear Lupe Song, Sept 27: Ammonia normal 48 and normal less than 72 umol/L. Would still add the xifaxan as we saw the asterixis clincially and you have the memory lapses and see how you do. Inr 1.3 down from 1.4 from 1.5. Glu 111 elevated from 104. Bun 14 and cr 0.7. Na 134 and k 4.3 and cl 100 and co2 20 and ca 8.5 little low. Albumin 3.3 and little lower and due to the taps and liver disease. Tb 8.8 and down 9.4. Alk 142 and up from 113. Ast 37 and alt 16. Prior ast 35 and alt 16. Wbc 5.3 hg 12 and mcv 100.8 up but little lower. Platelets 70 and down from 81. Neutrophils 3498 and lymphs 1643 normal but monocytes low 106. Meld 18 and meld na 20. Lets see how you do with the diuretics and lets follow the labs and weight trends closely. Called the pt to discuss labs. No side effects from the xifaxan and not seeing a change. Weight 177.2 and 1 pound up from last week. Nothing yet from [facility].  She had paracentesis on 10/3/23 had paracentesis and took off 7.5 L.  She is now 166.  wbc 4.8 red blood cells 3.66 mcv 108.7, platelets 84, 13.4,  inr 1.6,  cr. 0.7, ast 43, alt 19, alp 151, tb 7.2 MELD 19 MELD na 23.   she  is taking the aldactone 50 and lasix 20 mg and we will go up on this  Dr Gee Dear Lupe Song, September 1 ultrasound sent in and it showed a 5.9 L tap. It states specifically they did not do any studies on the fluid as apparently they do not have a standing order lab study protocol requested for this. Again this is one of the limitations of the doing the taps elsewhere. September 22 ultrasound also sent in and 4.7 L was removed and again the fluid was discarded as there was no requested analysis and no standing order protocol to do so. Here they do this on the fluid automatically. That explains why we never received any other fluid studies from there. It is possible that the team there could speak with them to get them to do the studies with the next tap. Dr. Gee recap She had been less jaundiced and she says over the last 5 weeks people commented.  3m off alcohol and send over to the oltx,  Doing therapy with private therapist.  Refer to [facility] as 3m and not getting better.  Did ct at [facility] and done for pain post tap and that was new and chills and Dr Tolentino and went in and colitis.  They gave her abx and bentyl.   Sept 20 labs show inr little up 1.4 but down from 1.5. Glucose 104 elevated but down from 132. Bun 15 and cr 0.67 and na 135 and k 4.5 and cl 101 and co2 23 and calcium 8.5 and little low and may want to share with primary and look at this and your vitamin d level status. Albumin little lower 3.4 from 3.7 prior. Total bili 9.4 and up from 6.2 and prior 7.3. Alk 113 and ast 35 and alt 16. Prior ast 9 and alt 18. Wbc 3.9 and hg 12.7 and mcv 101.7 elevated. Platelets low 81. Rbc little low 3.47 and mch little up 36.6. Neutrophils 2543 and lymphs 1045 normal. Monocytes little low 191. Meld 19 and meld na 20. This went up from 17 and concerning to see.  She did tap friday and got 4.5 liters off and says did tap on side and not drain as much.  re diuretics she was on lasix 20mg and aldactone once day.  Can go up to bid on those and do labs next week and the following,  telemed in 2 weeks and see doing.  July 31st labs showed your iron sat was elevated at 55%. We may need to do a hemochromatosis panel to follow-up on that to see why it is running high. It could still also be high as well because of the alcohol use previously. Ceruloplasmin was normal at 32. Ferritin was also again up in keeping with iron being up at 440. Hep B immunity was seen at a level of 105. Your alpha-1 screen was normal at MM type. Sugar was up at 132 and higher from July 15 when it was 115. BUN is 16 creatinine 0.65 sodium 135 potassium 4.6 calcium 8.9 albumin 3.7 normal, bilirubin was 6.2 down from 7.3 (not fractionated and need that done next time) and alk phos was 154 from 158 and AST 39 from 36 and ALT of 18 from 14. WBC was 4.2, hemoglobin 12.4 and platelet count low at 67. MCV was 105. INR was elevated at 1.5 with a prothrombin time 17.4. No hep A immunity was seen. You should consider getting the hepatitis A vaccine series electively. Summary: DF 17.2 so less than 32. Meld 18 and meld na 19 so less than 21 which is good to see but would want it to be less than 15 and above that.  Also, as you recall, you had previously in June when back in the hospital had a MELD score that had been less than 21 and a DF score of less than 32. You had lost a substantial amount of weight also between your illness and your diuretics. Despite the high sugar your A1c was reported not to be up. Need to follow trends as more time passes. Treatment is as we discussed supportive and to watch for any issues with your diuretics or labs over time. Continued abstinence is key.  recap:  Patient was last seen by them on July 14, 2023 for a posthospital visit.  She was apparently admitted on June 27, 2023 to a local facility for worsening abdominal distention and edema.  She had a history of alcohol usage. Pt at the time was having several glasses at night for years.  Dr Wills is for women having more 2 drinks a day will lead to cirrhosis over time.  Her father apparently passed away in March and she had been drinking heavily until then.  Alcohol stopped since June 25/26 and has not done any counseling.  Admission labs reported a hemoglobin of 12.2 platelet count 102 INR 1.6 bilirubin 4.3 alk phos 203 AST 69 ALT 20 lipase 97 ethanol level 222. Hepatitis ABC panel was reported negative.  Patient had a MELD score reported to be 17 but do not have a DF score. Severe is alc hep meld over 21. But above the 15 cut off for oltx.  CT of the abdomen showed cirrhotic nodular liver as well as portal pretension and diffuse abdominal pelvic ascites.  June 28, 2023 EGD was reported normal.  June 27,011 and 11 L paracentesis was done on June 29 with 7.5 L paracentesis was removed.  July 18 7.5 L and 4th one this friday.  She is learing to be on low salt diet.  She had a tap tomorrow will be 2 weeks post the last one.  Patient was started on Lasix 20 and Aldactone 50 mg a day and was inc since to 40 and 100mg  Weight is down 75 from admit and part fluid and part not.  Still losing some.  The ascites may be better with the dose inc.  She was given lactulose mostly for constipation per their note and not for encephalopathy issues.  When seen on July 14 she was not on any special diet but had lost about 11 pounds more amd was 251 admit since leaving the hospital.  Weight was noted to be 194.4 pounds at the time with a BMI of 34.43.  Abdomen is reported to be distended then.  Local labs from July 14 showed AFP of 4.6. With a sugar of 115 BUN of 12 creatinine 0.5 sodium 136 potassium 4.7 chloride 103 CO2 of 21 calcium 8.8 albumin diminished at 3.4 bilirubin elevated 7.3 alkaline phosphatase 158 AST of 36 and ALT of 14. WBC 4.2 hemoglobin 13.3 plate count 94 .4 with neutrophils 2617 and lymphocytes 1205. Prothrombin time was listed as 14.4 with normal up to 11.5 seconds there. INR was 1.4.  Df 20.6  Meld 18/19.  Able to review some of the [facility] records directly and saw that on June 28, 2023 Dr. Perkins did the EGD and it was normal with the Z-line at 37 cm and the stomach and duodenum also appeared normal.  The June 27, 2023 admit consult mentions how patient had been complaining about edema for a week and had been using alcohol at least a bottle of wine per day for several years and then had cut back to 3 to 4 glasses a day but then when her father passed in March she was drinking more heavily and came in with the hemoglobin of 12.2 INR 1.6 platelets 102 bilirubin 4.3 alk phos 283 AST 69 ALT 20 lipase 97 and ethanol of 222.  Mention how the CT again has shown the diffuse abdominal pelvic ascites as well as sequela of portal pretension and the cirrhosis.  She never had a colonoscopy. Previous EGD had been 20 years ago.  Other past medical history listed ADHD, alcohol use, anxiety, diabetes, ascites. Jaundice.  A1c 4.4 % without meds.  Alcohol usage again was as listed above. Denied tobacco usage.  CT scan had shown lower thorax with no effusion of the pleural or pericardial area but did a cirrhotic nodular liver with sequela of hypertension including gastroesophageal and splenic varices and moderate to large intra-abdominal ascites. The fluid appeared simple. Gallbladder was unremarkable. No bile duct dilation seen. Spleen was enlarged. Splenic mass. Perisplenic ascites were seen. No significant bowel wall thickening or evidence of free air was seen. Atherosclerotic calcifications were seen in the abdominal aorta and branches but without aneurysm. They felt her MELD score was 17 at the time and they plan for the EGD to be done. July 18, 2023 ultrasound was 7.1 L. June 29, 2020 ultrasound 7.5 L. June 27, 2000 2311 L.  Discharge summary mentions she was admitted from the 27th of the 29th and that she was scheduled to follow-up with Dr. Cruz.  She was a 3m wait so was set up to see u.s.  They had recommended her to be on thiamine and folic acid.  She was kept on her Lasix and Aldactone after her paracentesis treatments. She was monitored for withdrawal and they were not noted. She was kept on her citalopram and Adderall for her anxiety/ADHD/depression.  She was reported to have type 2 diabetes but with an A1c of 4.4 and been off of medicines for 2 years.  Discharge medicines include citalopram 1 and half tablets at night, folic acid 100-day, Lasix 20 mg a day, lactulose 15 mL once a day as needed, Aldactone 50 mg a day and thiamine 100 mg a day.   Plan: 1. Pt doing well 3m now post and had hcv tx done for the pos donor and b core vemlidy. 2. :abs doing well. 3. Stopped the valcyte now recently.  4. Follow course. 5. See us in 3m.    Duration of visit was 55 minutes with 30 minutes spent prepping chart and loading info from labs and contacts for the visit to [facility] records and then 25 additional minutes for this healow telemed visit with time spent reviewing chart and events and plans with the pt and in discussing plans.

## 2025-07-09 ENCOUNTER — TELEPHONE ENCOUNTER (OUTPATIENT)
Dept: URBAN - METROPOLITAN AREA CLINIC 86 | Facility: CLINIC | Age: 40
End: 2025-07-09

## 2025-07-09 ENCOUNTER — OFFICE VISIT (OUTPATIENT)
Dept: URBAN - METROPOLITAN AREA TELEHEALTH 2 | Facility: TELEHEALTH | Age: 40
End: 2025-07-09

## 2025-07-09 RX ORDER — TACROLIMUS 1 MG/1
3 CAPSULE ORAL TWICE A DAY
Status: ACTIVE | COMMUNITY

## 2025-07-09 RX ORDER — MYCOPHENOLATE MOFETIL 500 MG/1
2 TABLET ORAL TWICE A DAY
Status: ACTIVE | COMMUNITY

## 2025-07-09 RX ORDER — CARVEDILOL 6.25 MG/1
1 TABLET WITH FOOD TABLET, FILM COATED ORAL TWICE A DAY
Status: ACTIVE | COMMUNITY

## 2025-07-09 RX ORDER — TENOFOVIR ALAFENAMIDE 25 MG/1
1 TABLET WITH FOOD TABLET ORAL ONCE A DAY
Status: ACTIVE | COMMUNITY

## 2025-07-09 RX ORDER — FAMOTIDINE 20 MG/1
1 TABLET AT BEDTIME AS NEEDED TABLET, FILM COATED ORAL ONCE A DAY
Status: ACTIVE | COMMUNITY

## 2025-07-09 RX ORDER — CITALOPRAM 20 MG/1
1 TABLET TABLET, FILM COATED ORAL ONCE A DAY
Status: ACTIVE | COMMUNITY

## 2025-07-09 RX ORDER — SULFAMETHOXAZOLE AND TRIMETHOPRIM 800; 160 MG/1; MG/1
1 TABLET TABLET ORAL EVERY OTHER DAY
Status: ACTIVE | COMMUNITY

## 2025-07-09 RX ORDER — DOCUSATE SODIUM 100 MG/1
1 CAPSULE AS NEEDED CAPSULE ORAL ONCE A DAY
Status: ON HOLD | COMMUNITY

## 2025-07-09 NOTE — HPI-TODAY'S VISIT:
Patient is a 40 year-old female seen April 2025 at the referral from Barb Espinosa nurse practitioner and Dr. Antoine Gomez for evaluation of recently noted cirrhosis suspected due to prior alcohol and  s.p oltx dec 2024 and here for post oltx monitoring,    A copy of the note will be sent to the referring provider.    July 2, 2025 labs showed WBC 4 open 10.7 hematocrit 32.4 MCV 78.7 platelet count 113 neutrophils 3.1 lymphocytes 0.6 tacrolimus level 9 hep C PCR negative.  Magnesium 1.7.  Gamma GTP 15 glucose 161 elevated BUN 35 creatinine 0.14 sodium 135 potassium 5.3 calcium 9.2 albumin 4.4 AST 16 ALT 11 bilirubin 0.4  February 20, 2025 imaging showed no acute findings and biliary stent was not seen.  Car[place] 20th 2025 imaging showed minimal right pleural effusion.  January 2025 hemoglobin A1c 3.8.  January 23 2025 ct  01/23/2025 11:58 AM EST  1. Postsurgical changes of liver transplant. The liver transplant graft  is noncirrhotic in morphology.  2. Peripheral wedge-shaped area of hypoenhancement within the right  hepatic lobe, segment 5/8. This is nonspecific and could represent a  small hepatic infarct. Adjacent small subcapsular fluid collection along  the anterior right hepatic dome measuring approximately 5.4 x 1.4 cm  suggestive of a small subcapsular hematoma. Further evaluation with  dedicated CT liver protocol without and with contrast may be considered  for further characterization of these findings as clinically warranted.  3. Superficial right mid abdominal subcutaneous fluid collection  underlying the incision scar measuring 6.4 x 4.3 x 3.4 cm. This is  favored to represent a postoperative seroma or hematoma. Superimposed  infection is difficult to exclude in the appropriate clinical setting.  4. Small to moderate right pleural effusion with adjacent compressive  atelectasis in the right lower lobe.  5. Splenomegaly.  6. Cystic appearing right ovarian lesion measuring 2.4 cm is  statistically favored to represent a physiologic ovarian cyst.  7. Common bile duct stent remains in place, adequately positioned.     January 2025 hep B DNA not detected and HIV negative.    [facility] transplant records were reviewed and we saw that the patient was confirmed genotype Ia back December 18, 2024 and not detected January 16, 2025 and has remained undetected through July 2, 2025.  April 2025 HIV testing was negative.  More recent July labs showed glucose 161 BUN 35 creatinine 1.14 up from 1.04 in June and 1.1 in May 2025.  Sodium 135 which is a little low potassium up a little at 5.3 chloride 104 CO2 of 26 albumin 4.4 AST 16 ALT 11 alk phos 78 bilirubin 0.4.  WBC 4 hemoglobin little lower at 10.7 from 11.7 before in June.  MCV 78.7 and platelet count 113.  CT scan done January 23, 2025  Showed postsurgical changes of liver transplant with the liver transplant graft.  Not cirrhotic.  Peripheral wedge-shaped area of hypoenhancement seen in the right lob in segment 5/8 which was nonspecific and they thought it could be a small hepatic infarct.  Adjacent small subcapsular fluid collection along the anterior right hepatic dome measuring 5.4 x 1.4 cm suggestive of a small subcapsular hematoma.  They recommended further eval with a dedicated CT.  Superficial right mid abdominal subcutaneous fluid collection under the incisional scar 6.4 x 4.3 x 3.4 favored to be a postoperative seroma/hematoma.  Could not rule out an infection appropriate setting.  Small to moderate right pleural effusion.  Splenomegaly seen.  Right ovarian cyst 2.4 cm and common bile duct in place.      June 13 visit with Dr. Andres mentions how the patient had her transplant December 15, 2024 Dr. Duct with stent and DCD with MPR and periumbilical hernia repair by Dr. Waldrop.  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  Patient was noted to be acquiring hep C from the donor and was treated.  There were labs trended down.  Pathology  Showed no malignancy.  She has been on Prograf and CellCept and prednisone and being tapered.  Daughter had been sick flu screen positive so she received pen G20 400,000 units weekly x 3 doses.  She received 12 weeks of Epclusa completed March 2025.  On tacrolimus now 3/3 and mycophenolate 500 twice daily.  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  There were lowering her mycophenolate to 250 twice daily.  She was felt to be cured from the hep C.      Chart prep:  Patient's last ultrasound paracentesis look was back on December 27, 2024 and no significant free fluid was seen.'    She did have a thoracentesis in Jan and not since.    She says fluid overload in cells and not drainable and took diuretics to get 30 pounds.    Weight 160.    [facility] Day 2024 complaining of 8 pounds of weight gain in 24 hours and due to the history of the transplant 10 days before in December 15, 2024 there were concerns of same.    Her creatinine had been trending down to 1.22 from 1.76 sodium had been 131.  She took the Lasix 40 once that day at the recommendation of [facility] De Jesus RAMIREZ at [facility].    [facility] discharge summary from December 14 through December 21 was reviewed as the patient had gone in for the liver transplant due to suspected prior alcohol and OHARA components given her diabetes, obesity and polycystic ovary.    She underwent a transplant with Duct to Duct with stent and it passed already and a DCD donor with MPR.  She had a primary umbilical hernia repair done by Dr. Waldrop.     Donor was positive for hep B surface antibody and B core and hep C antibody positive, syphilis positive toxoplasma positive.  No malignancy on explant noted.     Patient responded well postoperatively and she had some acute kidney injury though noted and transplant nephrology was seen and a trial of Bumex was given.  She had hypokalemia.  She then improved.  She was maintained on Prograf and CellCept.    She was started on tenofovir 25 once a day due to her renal sufficiency but the hep B core positive status to prevent reactivation.     Donor was set to screen reactive so she was to receive pen G2 400,000 units IM weekly for 3 doses due on December 16, December 23 and December 30.     She was was donor toxo positive recipient was toxo IgG negative.    Patient was noted to have some steroid-induced hyperglycemia and endocrine had to help adjust accordingly.    Coreg was started for renovascular hypertension.    Discharge medicines included carvedilol 6.25 mg twice a day, Colace 100 mg a day, epinephrine 0.3 mL as needed, famotidine 20 mg a day, multivitamin with folic acid every day, mycophenolate 500 mg twice a day, oxycodone 5 mg every 4 hours Samantha, pen G x 3 doses, MiraLAX 17 g a day.  Prednisone 5 mg a day.  Sodium bicarbonate 653 times a day, hep C treatment with Epclusa was started for hep C donor positive protocol.  Bactrim also was started every other day.  Tacrolimus was 2 mg twice a day.  Vemlidy 25 mg a day.  Valganciclovir 150 mg once a day.  As well as sliding scale insulin.  Also still on citalopram 20 mg a day.    More recent labs from March 27, 2025 showed WBC 2.26 which has been trending up from a recent level of 1.8 on March 6.  Hemoglobin 10.8 hematocrit 32.7 MCV 84 and platelet count 110.  Glucose 116 BUN 32 creatinine 1.05 sodium 137 potassium 5.1 chloride 105 CO2 23 calcium 9 albumin 4.2 AST 24 ALT 13 alk phos 99 bilirubin 0.2 magnesium was 1.7 tacrolimus level was 6.6    Regarding her hepatitis C she was genotype Ia on December 18 from the donor and was noted to be hep C PCR negative Jan 27th and March 27 checks. She just ended the epclusa and key 1m and 3m.    January 30, 2025 A1c was 3.8.    [facility] CT of the abdomen done January 23, 2025:    1.  Postsurgical changes of liver transplant. The liver transplant graft  is noncirrhotic in morphology.  2.  Peripheral wedge-shaped area of hypoenhancement within the right  hepatic lobe, segment 5/8. This is nonspecific and could represent a  small hepatic infarct. Adjacent small subcapsular fluid collection along  the anterior right hepatic dome measuring approximately 5.4 x 1.4 cm  suggestive of a small subcapsular hematoma. Further evaluation with  dedicated CT liver protocol without and with contrast may be considered  for further characterization of these findings as clinically warranted.  3.  Superficial right mid abdominal subcutaneous fluid collection  underlying the incision scar measuring 6.4 x 4.3 x 3.4 cm. This is  favored to represent a postoperative seroma or hematoma. Superimposed  infection is difficult to exclude in the appropriate clinical setting.  4.  Small to moderate right pleural effusion with adjacent compressive  atelectasis in the right lower lobe.  5.  Splenomegaly.  6.  Cystic appearing right ovarian lesion measuring 2.4 cm is  statistically favored to represent a physiologic ovarian cyst.  7.  Common bile duct stent remains in place, adequately positioned  January 8, 2025 ultrasound guided right thoracentesis of 1.25 L was done at [facility].  Jan 2 ct chest:    Chest:  No significant coronary artery calcifications. The  cardiovascular structures are within normal limits.  There is no  pericardial effusion.  No mediastinal masses or significant adenopathy  is appreciated. Increasing size of right-sided pleural effusion with  underlying atelectasis. No pneumothorax. The central airways are patent.   The lungs are expanded and the lung parenchyma is clear. No suspicious  nodules are demonstrated.      There are no suspicious lytic or blastic bone lesions.    Limited images through the upper abdomen show the spleen is enlarged.  Some heterogeneity in the right hepatic lobe. Partially imaged. Likely  related to transplant. Small amount of intra-abdominal ascites    She says it has been getting smaller.    February 28, 2025 transplant clinic visit with Dr. Dallas Sanders shows that the patient again had been transplanted with the above findings.  They were doing the steroid taper.  She is maintained on Valcyte for CMV intermediate risk.  I been treated with 3 doses of her PCN for the syphilis a potential exposure.  Still with some incisional pain.  BMI was 29.05 weight of 164 (160 in april_    They plan to reduce the tacrolimus by 1 with a goal of 6-8.  She had improved her hyperglycemia from the steroids.  She started her hep C treatment December 31, 2024 and had gone undetectable.  Remain on her Vemlidy for the hep B core positive status.    To be seen again April 14,2025 by Dr. Andres.    Post tx 2-  xray no acute findings and biliary stent not seen.    Jan 23 2025: ct heart is normal in size and without pericardial effusion and small to mod right pleural effusion with adjacent compressive atelectasis in the right lower lobe. Post oltx seen and non cirrhotic and wedge shaped area hypoenhancement in right lobe seg 5/6 and small adjacent subcapsular fluid collection 5.4 x1.4cm and spleen 16.7cm and no adrenal nodularity and pancreas was atropic without evidence of main pancreatic ductal dilation and gb absent and cbd stent in place. no sig dilaiton and kidneys symmetric and no hydronephrosis and mold atherosclerosis and no ascites. Superficial right mid abdominal sub q fluid collecition incision sca 6.4x4.3x3.4cm and trace umbilical hernia.cystic right ovarian lesions 2.4cm and likely physicolgic ovarian cyst,    feb 20 2025 labs wbc 45.4 and hg 11.8 and hct 36 and plateets 150 and neutrophils 3.2 and lymphs 0.9 and tac 8.5 ggt 22mg 1.6 glu 150 and bun 40 and cr 0.93 and na 134 and k 4.6 and cl 105 and co2 22 and ca 9.1 and tp 7.0 alb 4.4 and ast 23 and alt 15 and alk 71 and tb 0.4       November 10 labs noted showing glucose 106 BUN is 16 creatinine 0.78 sodium 139, K 2.5 CO2 22 calcium 8.5 albumin 3.2 AST 39 ALT 17 alk phos 126 bilirubin elevated at 5.9. TSH 2.44 which was up a little bit for her. Ethyl alcohol screen was negative at less than 10. INR was 1.14 with a prothrombin time 15.7. WBC 4.3 hemoglobin 12.8 MCV 94.1 platelet count 99. Neutrophils 2.8 lymphocytes 1.1. Magnesium 2.1. She says meld was 19.    November 4 labs show albumin 3.2 AST 42 ALT 17 alk phos 127 bilirubin 5.4. INR 1.41.    October 8 2024 labs showed albumin 3.6 AST 46 ALT 20 alk phos 121 bilirubin 5.7. INR 1.45.    September 10,024 labs show AST 50 ALT 25 alk phos 145 bilirubin 4.4. INR 1.48.    Patient in the emergency room November 10, 2024 with history of her cirrhosis, diabetes, hyperlipidemia status post TIPS and was noted to have intermittent tremors at that timeframe. She has been waiting on her transplant. She has been compliant with her lactulose, Aldactone and Lasix was to see Dr. Andres soon. Last MELD score was 19.    Current medicines listed were being on citalopram 40 mg half a tablet a day, folic acid 800 mcg a day, furosemide 40 mg once a day. Lantus 14 units in the morning and 14 at night. Sliding scale insulin 15 units 3 times a day for blood glucose greater than 140. Lactulose 30 mL 3 times a day, ondansetron 4 mg twice a day as needed, pantoprazole 40 minutes a day. Spironolactone 100 mg a day, thiamine 100 once a day and send lasix 20 mg twice a day. xif bid.    Dr Andres thought was pse role. Kept of lactulose and xifaxan. Not to drive.    Weight was listed as being 73.9 kg 163 pounds. They did a CT of the head with no acute findings. They gave her 1 L fluid bolus x 2 she has been feeling better with that. No other acute findings were noted so she was discharged to follow-up with Dr. Andres.    CT scan was obtained September 25, 2024 at [facility] that showed lung bases clear, liver cirrhotic with nodular contour and lobar redistribution and no suspicious lesions. Shunt appears patent.  Vessels: The main hepatic vein were patent. No intrahepatic bile duct dilation. Small gallstones were seen. Persistent splenomegaly. No ascites was seen. Small gastroesophageal varices seen. No aneurysm dilation of the aorta is seen. Small fat-containing umbilical hernia seen. Cirrhotic liver seen including splenomegaly and small varices. No lesions. TIPS patent.    Last tap was right after tips in Aug.    She says meld is at 23 now and she says almost got it in Aug 2nd week and had some bleeding and saw had cut gum and bleeding and factors given.    Tips was done July 1 and successful.     Tapped x 2 and both times less than a liter and prior was once a week getting tapped.    She says sugars off and on insulin.    Last Meld score is running 19 and we suspected they would put in a small tips.    She says tb went from to 3s and now in 8s and this is post tips. Lowest post tips was 4.    Prior diuretics aldactone 200mg and lasix 80mh and then dropped ald 100mg and 40mg lasix and assessing and dosing.    Did egd and added protonix and not oozing and some bleeding.    Aug 29 [facility] note: [place] [facility] [place] Cellulitis where monitor was and placed her on keflex.     May 7 labs showed glucose still elevated at 159 but down from 167 in March.  BUN up a little at 37 and creatinine of 1.08 from previous BUN of 24 and creatinine 0.86.  Unclear if you are running a little dry this day.  Sodium 129 a little bit lower and potassium upper normal at 5.1.  Previously sodium 132 and potassium 4.1.  Chloride 96 is a little low.  CO2 of 24.  Albumin normal at 4.1 and that could be from post albumin infusions potentially or if you are running a little dry?  Bilirubin up to 6.3, alk phos 127 AST 54 and ALT 33 previously bilirubin 5.4 alk phos 140 AST 39 and ALT 24.    May Called pt: She is on Aldactone 100mg 2 am and Lasix 80mg and in p, aldactone 100mg po qpn and 40mg po qpm.   The pm doses were added at [facility] by oltx due to fluid and had need for thoracentesis.  She will contact [facility] tomorrow as they were talking re doing the tips and need to consider who to address rising labs with inc med dosing.   Suspect the pm doses likely need to be dropped or stopped and see how does.  We will fax the labs tomorrow am to Dr Andres so that they can look at these and decide on best next option given listed now.     Due for ct in July and she did a ct in april and was told was ok.      3/20/24 labs : The glucose 167, this is elevated if you are fasting. Creatinine 0.6, sodium low at 132, calcium slightly low at 8.5, lower limits of normal is 8.6. Albumin 3.3 and this is on follow-up. Bilirubin 7.4, alkaline phosphatase 140, AST 39, ALT 24. The white blood cells 5.2, hemoglobin 13.1, .4 which is elevated need to be sure you are taking your vitamin B12 and folic acid. Platelets 83 and this is low and continue to monitor. INR 1.2. MELD 15, MELD na 19 so lower and continue to monitor and review at the follow visit.    She has been seen and saw them Feb and april 19 at [facility].    Waiting on 6m counseling to finish in May.    3/11/24 MRI was sent to me. The liver without fat or iron. They did see cirrhosis but did not see any focal lesions. The gallbladder/biliary tree with gallstones but they did not see any inflammation. Spleen enlarged at 17 cm. The pancreas with cysts that they thought were most consistent with intraductal papillary mucinous neoplasms or IPMNs and these are benign. Largest was measuring up to 10 mm, we will review this at your follow-up visit and see if we can compare this to any prior imaging to see if we need to take a closer look.  They did not see any ductal dilatation. The adrenal glands, kidneys, gastrointestinal, lymph nodes all normal. They noted the hepatic vasculature was patent. They noted varices. Moderate ascites was seen. And they noted a large umbilical hernia containing some fat and ascites as well. Overall they saw cirrhosis with portal hypertension but they did not see any hepatocellular carcinoma. They also saw pancreatic cystic lesions which they thought could be IPMN's. Will review this at your follow-up.    Not needing any ascites  taps since earlier but chest now that is issues.    Weight June 149 and prior visit was 146 and post tap drops to 140 or so.      1/31/24 healow.  SHe went for paracentesis on 1/24/24 and not enough fluid to do  She is still on aldactone 200mg and lasix 80 mg  They increased her lactulose to 3 times a day 30 mL a day to help with constipation and this is helping.   She pending colonoscpy and they are going to try to work her in . They are going to do a repeat EGD at the same time.     She has a mammogram monday on right breast and us for oltx and did and was ok and not an issue.  She is doing SARP current with insurance         12/6/23 healow  MELD 22 on 11/28/23 labs   she was at the ER this weekend she had complex migraine  she saw dr. andres starting oltx   she is now   200 mg aldactone and 80 mg lasix and takign all in am   she had labs done on 12/3/23 at er and stable cr and lytes    she will do day long visit soon to start tolx process    sh eis still doing paracentesis    She is doing a new counselor this after     11/6/23 telemedicine   11/2/23 labs were sent to me., Hemoglobin 12.3, MCV 98, platelets 54 and this is low. Glucose 140, creatinine 0.56, sodium 135, potassium 4.3, bilirubin 7.6, alkaline phosphatase 122, AST 43, ALT 23. INR 1.4, back up. MELD up at 19, MELD-na 19.     she is using the bathroom more so diuretics may be helping  weight 170 and still doing paracentesis every 2 weeks so not supriya to be any different  she gains on average 10 lbs between them   still have appt 11/28/23 with dr. andres.       10/26/23 ov  SHe has oltx appt on 11/28  she had nathen yesterday and removed 4.6 L Happy to see this.   THis was 9 days after previous, so sooner bc the previous had suction issues so not as much done.   She is shcedule through the end of the year     The 10/23/23 labs were sent to me. The white blood cells low at 3.5 and we will continue to monitor. Red blood cells 3.7, hemoglobin 12, .5 and this is elevated be sure you are taking the B12. Platelets low at 71 and we will continue to monitor. The glucose 114, creatinine 0.62, sodium 138, potassium 4.3, bilirubin 7.5, alkaline phosphatase 122, AST 42, ALT 24, INR 1.3. Bilirubin slightly lower which is good to see. INR down from 1.4 which is also good to see. MELD still up at 17    The 10/11/23 labs were sent to me. The glucose 106 which is elevated if you are fasting. Creatinine 0.7, sodium 137, potassium 4.8, bilirubin 7.9, alkaline phosphatase 116, AST 45, ALT 22. White blood cells low at 3.7, red blood cells 3.5, hemoglobin 12.8, , platelets 66. INR 1.4. The bilirubin slightly lower now as previously it was 8.8. The AST at 45 and we will need to continue to monitor this. MELD 18. Please let us know if you have not been able to get the appointment with [facility] oltx team. We will see what they look like next with with increasing the aldactone in efforts to try to slow need for paracentesis.       recap  10/10/23 ov  Dear Lupe Song,  Sept 27:  Ammonia normal 48 and normal less than 72 umol/L. Would still add the xifaxan as we saw the asterixis clincially and you have the memory lapses and see how you do.  Inr 1.3 down from 1.4 from 1.5.  Glu 111 elevated from 104.  Bun 14 and cr 0.7.  Na 134 and k 4.3 and cl 100 and co2 20 and ca 8.5 little low.  Albumin 3.3 and little lower and due to the taps and liver disease.  Tb 8.8 and down 9.4.  Alk 142 and up from 113.  Ast 37 and alt 16. Prior ast 35 and alt 16.  Wbc 5.3 hg 12 and mcv 100.8 up but little lower.  Platelets 70 and down from 81.  Neutrophils 3498 and lymphs 1643 normal but monocytes low 106.  Meld 18 and meld na 20.  Lets see how you do with the diuretics and lets follow the labs and weight trends closely.  Called the pt to discuss labs. No side effects from the xifaxan and not seeing a change.  Weight 177.2 and 1 pound up from last week. Nothing yet from [facility].    She had paracentesis on 10/3/23 had paracentesis and took off 7.5 L.   She is now 166.   wbc 4.8 red blood cells 3.66 mcv 108.7, platelets 84, 13.4,   inr 1.6,   cr. 0.7, ast 43, alt 19, alp 151, tb 7.2  MELD 19 MELD na 23.     she  is taking the aldactone 50 and lasix 20 mg and we will go up on this    Dr Gee Dear Lupe Song,  September 1 ultrasound sent in and it showed a 5.9 L tap. It states specifically they did not do any studies on the fluid as apparently they do not have a standing order lab study protocol requested for this. Again this is one of the limitations of the doing the taps elsewhere.  September 22 ultrasound also sent in and 4.7 L was removed and again the fluid was discarded as there was no requested analysis and no standing order protocol to do so.  Here they do this on the fluid automatically.  That explains why we never received any other fluid studies from there.  It is possible that the team there could speak with them to get them to do the studies with the next tap.  Dr. Gee  recap  She had been less jaundiced and she says over the last 5 weeks people commented.    3m off alcohol and send over to the oltx,    Doing therapy with private therapist.    Refer to [facility] as 3m and not getting better.    Did ct at [facility] and done for pain post tap and that was new and chills and Dr Tolentino and went in and colitis.    They gave her abx and bentyl.      Sept 20 labs show inr little up 1.4 but down from 1.5.  Glucose 104 elevated but down from 132.  Bun 15 and cr 0.67 and na 135 and k 4.5 and cl 101 and co2 23 and calcium 8.5 and little low and may want to share with primary and look at this and your vitamin d level status.  Albumin little lower 3.4 from 3.7 prior.  Total bili 9.4 and up from 6.2 and prior 7.3.  Alk 113 and ast 35 and alt 16. Prior ast 9 and alt 18.  Wbc 3.9 and hg 12.7 and mcv 101.7 elevated.  Platelets low 81.  Rbc little low 3.47 and mch little up 36.6.  Neutrophils 2543 and lymphs 1045 normal. Monocytes little low 191.  Meld 19 and meld na 20. This went up from 17 and concerning to see.    She did tap friday and got 4.5 liters off and says did tap on side and not drain as much.    re diuretics she was on lasix 20mg and aldactone once day.    Can go up to bid on those and do labs next week and the following,    telemed in 2 weeks and see doing.    July 31st labs showed your iron sat was elevated at 55%. We may need to do a hemochromatosis panel to follow-up on that to see why it is running high. It could still also be high as well because of the alcohol use previously.  Ceruloplasmin was normal at 32.  Ferritin was also again up in keeping with iron being up at 440.  Hep B immunity was seen at a level of 105.  Your alpha-1 screen was normal at [facility] type.  Sugar was up at 132 and higher from July 15 when it was 115.  BUN is 16 creatinine 0.65 sodium 135 potassium 4.6 calcium 8.9 albumin 3.7 normal, bilirubin was 6.2 down from 7.3 (not fractionated and need that done next time) and alk phos was 154 from 158 and AST 39 from 36 and ALT of 18 from 14.  WBC was 4.2, hemoglobin 12.4 and platelet count low at 67. MCV was 105.  INR was elevated at 1.5 with a prothrombin time 17.4.  No hep A immunity was seen. You should consider getting the hepatitis A vaccine series electively.  Summary:  DF 17.2 so less than 32. Meld 18 and meld na 19 so less than 21 which is good to see but would want it to be less than 15 and above that.    Also, as you recall, you had previously in June when back in the hospital had a MELD score that had been less than 21 and a DF score of less than 32. You had lost a substantial amount of weight also between your illness and your diuretics. Despite the high sugar your A1c was reported not to be up.  Need to follow trends as more time passes.  Treatment is as we discussed supportive and to watch for any issues with your diuretics or labs over time. Continued abstinence is key.    recap:    Patient was last seen by them on July 14, 2023 for a posthospital visit.    She was apparently admitted on June 27, 2023 to a local facility for worsening abdominal distention and edema.    She had a history of alcohol usage. Pt at the time was having several glasses at night for years.    Dr Wills is for women having more 2 drinks a day will lead to cirrhosis over time.    Her father apparently passed away in March and she had been drinking heavily until then.    Alcohol stopped since June 25/26 and has not done any counseling.    Admission labs reported a hemoglobin of 12.2 platelet count 102 INR 1.6 bilirubin 4.3 alk phos 203 AST 69 ALT 20 lipase 97 ethanol level 222. Hepatitis ABC panel was reported negative.    Patient had a MELD score reported to be 17 but do not have a DF score. Severe is alc hep meld over 21. But above the 15 cut off for oltx.    CT of the abdomen showed cirrhotic nodular liver as well as portal pretension and diffuse abdominal pelvic ascites.    June 28, 2023 EGD was reported normal.    June 27,011 and 11 L paracentesis was done on June 29 with 7.5 L paracentesis was removed.    July 18 7.5 L and 4th one this friday.    She is learing to be on low salt diet.    She had a tap tomorrow will be 2 weeks post the last one.    Patient was started on Lasix 20 and Aldactone 50 mg a day and was inc since to 40 and 100mg    Weight is down 75 from admit and part fluid and part not.    Still losing some.    The ascites may be better with the dose inc.    She was given lactulose mostly for constipation per their note and not for encephalopathy issues.    When seen on July 14 she was not on any special diet but had lost about 11 pounds more amd was 251 admit since leaving the hospital.    Weight was noted to be 194.4 pounds at the time with a BMI of 34.43.    Abdomen is reported to be distended then.    Local labs from July 14 showed AFP of 4.6. With a sugar of 115 BUN of 12 creatinine 0.5 sodium 136 potassium 4.7 chloride 103 CO2 of 21 calcium 8.8 albumin diminished at 3.4 bilirubin elevated 7.3 alkaline phosphatase 158 AST of 36 and ALT of 14. WBC 4.2 hemoglobin 13.3 plate count 94 .4 with neutrophils 2617 and lymphocytes 1205. Prothrombin time was listed as 14.4 with normal up to 11.5 seconds there. INR was 1.4.    Df 20.6    Meld 18/19.    Able to review some of the [facility] records directly and saw that on June 28, 2023 Dr. Perkins did the EGD and it was normal with the Z-line at 37 cm and the stomach and duodenum also appeared normal.    The June 27, 2023 admit consult mentions how patient had been complaining about edema for a week and had been using alcohol at least a bottle of wine per day for several years and then had cut back to 3 to 4 glasses a day but then when her father passed in March she was drinking more heavily and came in with the hemoglobin of 12.2 INR 1.6 platelets 102 bilirubin 4.3 alk phos 283 AST 69 ALT 20 lipase 97 and ethanol of 222.    Mention how the CT again has shown the diffuse abdominal pelvic ascites as well as sequela of portal pretension and the cirrhosis.    She never had a colonoscopy. Previous EGD had been 20 years ago.    Other past medical history listed ADHD, alcohol use, anxiety, diabetes, ascites. Jaundice.    A1c 4.4 % without meds.    Alcohol usage again was as listed above. Denied tobacco usage.    CT scan had shown lower thorax with no effusion of the pleural or pericardial area but did a cirrhotic nodular liver with sequela of hypertension including gastroesophageal and splenic varices and moderate to large intra-abdominal ascites. The fluid appeared simple. Gallbladder was unremarkable. No bile duct dilation seen. Spleen was enlarged. Splenic mass. Perisplenic ascites were seen. No significant bowel wall thickening or evidence of free air was seen. Atherosclerotic calcifications were seen in the abdominal aorta and branches but without aneurysm. They felt her MELD score was 17 at the time and they plan for the EGD to be done.  July 18, 2023 ultrasound was 7.1 L.  June 29, 2020 ultrasound 7.5 L.  June 27, 2000 2311 L.    Discharge summary mentions she was admitted from the 27th of the 29th and that she was scheduled to follow-up with Dr. Cruz.    She was a 3m wait so was set up to see u.s.    They had recommended her to be on thiamine and folic acid.    She was kept on her Lasix and Aldactone after her paracentesis treatments. She was monitored for withdrawal and they were not noted. She was kept on her citalopram and Adderall for her anxiety/ADHD/depression.    She was reported to have type 2 diabetes but with an A1c of 4.4 and been off of medicines for 2 years.    Discharge medicines include citalopram 1 and half tablets at night, folic acid 100-day, Lasix 20 mg a day, lactulose 15 mL once a day as needed, Aldactone 50 mg a day and thiamine 100 mg a day.     Plan:  1. Pt doing well 3m now post and had hcv tx done for the pos donor and b core vemlidy.  2. :abs doing well.  3. Stopped the valcyte now recently.   4. Follow course.  5. See us in 3m.        Duration of visit was  minutes with 20 minutes spent prepping chart and loading info from labs and contacts for the visit to [facility] records and then 25 additional minutes for this healow telemed visit with time spent reviewing chart and events and plans with the pt and in discussing plans.